# Patient Record
Sex: MALE | Race: WHITE | NOT HISPANIC OR LATINO | Employment: FULL TIME | URBAN - METROPOLITAN AREA
[De-identification: names, ages, dates, MRNs, and addresses within clinical notes are randomized per-mention and may not be internally consistent; named-entity substitution may affect disease eponyms.]

---

## 2017-03-13 ENCOUNTER — TELEPHONE (OUTPATIENT)
Dept: UROLOGY | Facility: CLINIC | Age: 32
End: 2017-03-13

## 2017-03-13 NOTE — TELEPHONE ENCOUNTER
----- Message from Melany Sims sent at 3/13/2017  2:59 PM CDT -----  Contact: [phone] - self  Freda - has questions re getting appt for fertility issues - please call patient at [phone]

## 2018-01-24 ENCOUNTER — OFFICE VISIT (OUTPATIENT)
Dept: URGENT CARE | Facility: CLINIC | Age: 33
End: 2018-01-24
Payer: COMMERCIAL

## 2018-01-24 VITALS
SYSTOLIC BLOOD PRESSURE: 137 MMHG | DIASTOLIC BLOOD PRESSURE: 79 MMHG | WEIGHT: 235 LBS | HEART RATE: 82 BPM | OXYGEN SATURATION: 96 % | RESPIRATION RATE: 18 BRPM | HEIGHT: 73 IN | BODY MASS INDEX: 31.14 KG/M2 | TEMPERATURE: 99 F

## 2018-01-24 DIAGNOSIS — J06.9 UPPER RESPIRATORY TRACT INFECTION, UNSPECIFIED TYPE: Primary | ICD-10-CM

## 2018-01-24 LAB
CTP QC/QA: YES
FLUAV AG NPH QL: NEGATIVE
FLUBV AG NPH QL: NEGATIVE

## 2018-01-24 PROCEDURE — 96372 THER/PROPH/DIAG INJ SC/IM: CPT | Mod: S$GLB,,, | Performed by: EMERGENCY MEDICINE

## 2018-01-24 PROCEDURE — 99213 OFFICE O/P EST LOW 20 MIN: CPT | Mod: SA,25,S$GLB, | Performed by: NURSE PRACTITIONER

## 2018-01-24 PROCEDURE — 87804 INFLUENZA ASSAY W/OPTIC: CPT | Mod: QW,S$GLB,, | Performed by: NURSE PRACTITIONER

## 2018-01-24 RX ORDER — BETAMETHASONE SODIUM PHOSPHATE AND BETAMETHASONE ACETATE 3; 3 MG/ML; MG/ML
9 INJECTION, SUSPENSION INTRA-ARTICULAR; INTRALESIONAL; INTRAMUSCULAR; SOFT TISSUE
Status: COMPLETED | OUTPATIENT
Start: 2018-01-24 | End: 2018-01-24

## 2018-01-24 RX ORDER — IBUPROFEN 200 MG
600 TABLET ORAL
Status: COMPLETED | OUTPATIENT
Start: 2018-01-24 | End: 2018-01-24

## 2018-01-24 RX ADMIN — BETAMETHASONE SODIUM PHOSPHATE AND BETAMETHASONE ACETATE 9 MG: 3; 3 INJECTION, SUSPENSION INTRA-ARTICULAR; INTRALESIONAL; INTRAMUSCULAR; SOFT TISSUE at 09:01

## 2018-01-24 RX ADMIN — Medication 600 MG: at 08:01

## 2018-01-24 NOTE — PATIENT INSTRUCTIONS
Take over the counter Claritin, Allegra, or Zyrtec for relief of symptoms.   Take over the counter Flonase for relief of symptoms.  These medications can be purchased at any local drug store or pharmacy.    Please arrange follow up with your primary medical clinic as soon as possible. You must understand that you've received an Urgent Care treatment only and that you may be released before all of your medical problems are known or treated. You, the patient, will arrange for follow up as instructed. If your symptoms worsen or fail to improve you should go to the Emergency Room.          Viral Upper Respiratory Illness (Adult)  You have a viral upper respiratory illness (URI), which is another term for the common cold. This illness is contagious during the first few days. It is spread through the air by coughing and sneezing. It may also be spread by direct contact (touching the sick person and then touching your own eyes, nose, or mouth). Frequent handwashing will decrease risk of spread. Most viral illnesses go away within 7 to 10 days with rest and simple home remedies. Sometimes the illness may last for several weeks. Antibiotics will not kill a virus, and they are generally not prescribed for this condition.    Home care  · If symptoms are severe, rest at home for the first 2 to 3 days. When you resume activity, don't let yourself get too tired.  · Avoid being exposed to cigarette smoke (yours or others).  · You may use acetaminophen or ibuprofen to control pain and fever, unless another medicine was prescribed. (Note: If you have chronic liver or kidney disease, have ever had a stomach ulcer or gastrointestinal bleeding, or are taking blood-thinning medicines, talk with your healthcare provider before using these medicines.) Aspirin should never be given to anyone under 18 years of age who is ill with a viral infection or fever. It may cause severe liver or brain damage.  · Your appetite may be poor, so a light  diet is fine. Avoid dehydration by drinking 6 to 8 glasses of fluids per day (water, soft drinks, juices, tea, or soup). Extra fluids will help loosen secretions in the nose and lungs.  · Over-the-counter cold medicines will not shorten the length of time youre sick, but they may be helpful for the following symptoms: cough, sore throat, and nasal and sinus congestion. (Note: Do not use decongestants if you have high blood pressure.)  Follow-up care  Follow up with your healthcare provider, or as advised.  When to seek medical advice  Call your healthcare provider right away if any of these occur:  · Cough with lots of colored sputum (mucus)  · Severe headache; face, neck, or ear pain  · Difficulty swallowing due to throat pain  · Fever of 100.4°F (38°C)  Call 911, or get immediate medical care  Call emergency services right away if any of these occur:  · Chest pain, shortness of breath, wheezing, or difficulty breathing  · Coughing up blood  · Inability to swallow due to throat pain  Date Last Reviewed: 9/13/2015 © 2000-2017 [employer]. [address] [place] [address]. All rights reserved. This information is not intended as a substitute for professional medical care. Always follow your healthcare professional's instructions.

## 2018-01-24 NOTE — PROGRESS NOTES
"Subjective:       Patient ID: Heber Silva is a 32 y.o. male.    Vitals:  height is 6' 1" (1.854 m) and weight is 106.6 kg (235 lb). His temperature is 99.3 °F (37.4 °C). His blood pressure is 137/79 and his pulse is 82. His respiration is 18 and oxygen saturation is 96%.     Chief Complaint: Sinus Problem    Sinus Problem   This is a new problem. The current episode started yesterday. The problem is unchanged. There has been no fever. He is experiencing no pain. Associated symptoms include congestion, headaches, sinus pressure and a sore throat. Pertinent negatives include no chills, coughing, ear pain or hoarse voice. Past treatments include nothing.     Review of Systems   Constitution: Positive for malaise/fatigue. Negative for chills and fever.   HENT: Positive for congestion, sinus pressure and sore throat. Negative for ear pain and hoarse voice.    Eyes: Negative for discharge and redness.   Cardiovascular: Positive for chest pain. Negative for dyspnea on exertion and leg swelling.   Respiratory: Negative for cough, sputum production and wheezing.    Musculoskeletal: Negative for myalgias.   Gastrointestinal: Negative for abdominal pain and nausea.   Neurological: Positive for headaches.   All other systems reviewed and are negative.      Objective:      Physical Exam   Constitutional: He is oriented to person, place, and time. He appears well-developed and well-nourished.   HENT:   Head: Normocephalic and atraumatic.   Right Ear: Hearing, tympanic membrane, external ear and ear canal normal. Tympanic membrane is not erythematous.   Left Ear: Hearing, tympanic membrane, external ear and ear canal normal. Tympanic membrane is not erythematous.   Nose: Mucosal edema and rhinorrhea present. Right sinus exhibits no maxillary sinus tenderness and no frontal sinus tenderness. Left sinus exhibits no maxillary sinus tenderness and no frontal sinus tenderness.   Mouth/Throat: Uvula is midline, oropharynx is clear " and moist and mucous membranes are normal. No posterior oropharyngeal edema or posterior oropharyngeal erythema.   Eyes: Conjunctivae, EOM and lids are normal. Right conjunctiva is not injected. Left conjunctiva is not injected.   Neck: Normal range of motion and full passive range of motion without pain. Neck supple.   Cardiovascular: Normal rate, regular rhythm, S1 normal, S2 normal, normal heart sounds and normal pulses.    Pulmonary/Chest: Effort normal and breath sounds normal. No respiratory distress. He has no decreased breath sounds. He has no wheezes.   Neurological: He is alert and oriented to person, place, and time.   Skin: Skin is warm and dry.   Nursing note and vitals reviewed.      Assessment:       1. Upper respiratory tract infection, unspecified type        Plan:         Upper respiratory tract infection, unspecified type  -     POCT Influenza A/B    Other orders  -     ibuprofen tablet 600 mg; Take 3 tablets (600 mg total) by mouth one time.  -     betamethasone acetate-betamethasone sodium phosphate injection 9 mg; Inject 1.5 mLs (9 mg total) into the muscle one time.

## 2018-05-21 ENCOUNTER — OFFICE VISIT (OUTPATIENT)
Dept: URGENT CARE | Facility: CLINIC | Age: 33
End: 2018-05-21
Payer: COMMERCIAL

## 2018-05-21 VITALS
HEIGHT: 73 IN | DIASTOLIC BLOOD PRESSURE: 76 MMHG | OXYGEN SATURATION: 98 % | SYSTOLIC BLOOD PRESSURE: 121 MMHG | TEMPERATURE: 98 F | BODY MASS INDEX: 31.81 KG/M2 | WEIGHT: 240 LBS | RESPIRATION RATE: 18 BRPM | HEART RATE: 80 BPM

## 2018-05-21 DIAGNOSIS — R11.2 NAUSEA AND VOMITING, INTRACTABILITY OF VOMITING NOT SPECIFIED, UNSPECIFIED VOMITING TYPE: Primary | ICD-10-CM

## 2018-05-21 PROCEDURE — 99214 OFFICE O/P EST MOD 30 MIN: CPT | Mod: SA,S$GLB,, | Performed by: PHYSICIAN ASSISTANT

## 2018-05-21 PROCEDURE — 96372 THER/PROPH/DIAG INJ SC/IM: CPT | Mod: S$GLB,,, | Performed by: FAMILY MEDICINE

## 2018-05-21 PROCEDURE — 3008F BODY MASS INDEX DOCD: CPT | Mod: CPTII,S$GLB,, | Performed by: PHYSICIAN ASSISTANT

## 2018-05-21 RX ORDER — ONDANSETRON 2 MG/ML
4 INJECTION INTRAMUSCULAR; INTRAVENOUS
Status: COMPLETED | OUTPATIENT
Start: 2018-05-21 | End: 2018-05-21

## 2018-05-21 RX ORDER — ONDANSETRON 4 MG/1
4 TABLET, ORALLY DISINTEGRATING ORAL EVERY 6 HOURS PRN
Qty: 20 TABLET | Refills: 0 | Status: SHIPPED | OUTPATIENT
Start: 2018-05-21 | End: 2019-02-27

## 2018-05-21 RX ORDER — ONDANSETRON 2 MG/ML
4 INJECTION INTRAMUSCULAR; INTRAVENOUS
Status: DISCONTINUED | OUTPATIENT
Start: 2018-05-21 | End: 2018-05-21

## 2018-05-21 RX ADMIN — ONDANSETRON 4 MG: 2 INJECTION INTRAMUSCULAR; INTRAVENOUS at 11:05

## 2018-05-21 NOTE — PROGRESS NOTES
"Subjective:       Patient ID: Heber Silva is a 32 y.o. male.    Vitals:  height is 6' 1" (1.854 m) and weight is 108.9 kg (240 lb). His temperature is 98 °F (36.7 °C). His blood pressure is 121/76 and his pulse is 80. His respiration is 18 and oxygen saturation is 98%.     Chief Complaint: Emesis    This is a 32 y.o. male with History reviewed. No pertinent past medical history.    History reviewed. No pertinent surgical history.  who presents today with a chief complaint of   Nausea and vomiting started this morning.  He says it started with a sensation of heartburn, so he took tums and then started vomiting.  He feels better after each episode of vomiting.  He feels like he is going to have diarrhea, but each time he has gone to the bathroom so far it has been normal.      Emesis    This is a new problem. The current episode started today. The problem occurs 5 to 10 times per day. The problem has been unchanged. The emesis has an appearance of bile and stomach contents. There has been no fever. Associated symptoms include abdominal pain. Pertinent negatives include no chest pain, chills, diarrhea or fever. He has tried nothing for the symptoms. The treatment provided no relief.     Review of Systems   Constitution: Negative for chills and fever.   Cardiovascular: Negative for chest pain.   Respiratory: Negative for shortness of breath.    Musculoskeletal: Negative for back pain.   Gastrointestinal: Positive for abdominal pain, nausea and vomiting. Negative for constipation, diarrhea, hematochezia and melena.   Genitourinary: Negative for dysuria.       Objective:      Physical Exam   Constitutional: He is oriented to person, place, and time. He appears well-developed and well-nourished. No distress.   HENT:   Head: Normocephalic and atraumatic.   Eyes: Conjunctivae are normal.   Neck: Normal range of motion. Neck supple.   Cardiovascular: Normal rate and regular rhythm.  Exam reveals no gallop and no friction rub. "    No murmur heard.  Pulmonary/Chest: Effort normal and breath sounds normal. He has no wheezes. He has no rales.   Abdominal: Soft. Bowel sounds are normal. He exhibits no distension. There is tenderness in the right upper quadrant, epigastric area and left upper quadrant.   Musculoskeletal: Normal range of motion.   Neurological: He is alert and oriented to person, place, and time.   Skin: Skin is warm and dry. No rash noted. No erythema.   Psychiatric: He has a normal mood and affect. His behavior is normal. Judgment and thought content normal.   Nursing note and vitals reviewed.      Assessment:       1. Nausea and vomiting, intractability of vomiting not specified, unspecified vomiting type        Plan:         Nausea and vomiting, intractability of vomiting not specified, unspecified vomiting type  -     Discontinue: ondansetron injection 4 mg; Inject 4 mg into the vein one time.  -     ondansetron (ZOFRAN-ODT) 4 MG TbDL; Take 1 tablet (4 mg total) by mouth every 6 (six) hours as needed.  Dispense: 20 tablet; Refill: 0  -     ondansetron injection 4 mg; Inject 4 mg into the muscle one time.      Heber was seen today for emesis.    Diagnoses and all orders for this visit:    Nausea and vomiting, intractability of vomiting not specified, unspecified vomiting type  -     Discontinue: ondansetron injection 4 mg; Inject 4 mg into the vein one time.  -     ondansetron (ZOFRAN-ODT) 4 MG TbDL; Take 1 tablet (4 mg total) by mouth every 6 (six) hours as needed.  -     ondansetron injection 4 mg; Inject 4 mg into the muscle one time.      Patient Instructions     - Rest.    - Drink plenty of fluids.    - Tylenol or Ibuprofen as directed as needed for fever/pain.    - Take over the counter Imodium as directed as needed for diarrhea.  - Use over the counter pepcid, zantac, or tagament as directed.  - Use over the counter prilosec or nexium as directed.  - Follow up with your PCP or specialty clinic as directed in the next  "1-2 weeks if not improved or as needed.  You can call [phone] to schedule an appointment with the appropriate provider.    - Go to the ED if your symptoms worsen.    Vomiting (Adult)  Vomiting is a common symptom that may be due to different causes. These include gastroenteritis ("stomach flu"), food poisoning and gastritis. There are other more serious causes of vomiting which may be hard to diagnose early in the illness. Therefore, it is important to watch for the warning signs listed below.  The main danger from repeated vomiting is dehydration. This is due to excess loss of water and minerals from the body. When this occurs, body fluids must be replaced.  Home care  · If symptoms are severe, rest at home for the next 24 hours.  · Because your symptoms may be from an infection, wash your hands frequently and well, and use alcohol-based  to avoid spreading the infection to others.  · Wash your hands for at least 20 seconds. Hum the happy birthday song twice for the correct length of time.  · Wash your hands after using the toilet, before and after preparing food, before eating food, after changing a diaper, cleaning a wound, caring for a sick person, and blowing your nose, coughing, or sneezing. You should also wash your hands after caring for someone who is sick, touching pet food, or treats, and touching an animal, or animal waste.  · You may use acetaminophen or NSAID medicines like ibuprofen or naproxen to control fever, unless another medicine was prescribed. If you have chronic liver or kidney disease or ever had a stomach ulcer or GI bleeding, talk with your doctor before using these medicines. Aspirin should never be used in anyone under 18 years of age who is ill with a fever. It may cause severe liver damage. Don't use NSAID medicines if you are already taking one for another condition (like arthritis) or are on aspirin (such as for heart disease, or after a stroke)  · Avoid tobacco and " alcohol use, which may worsen your symptoms.  · If medicines for vomiting were prescribed, take as directed.  · Once vomiting stops, then follow these guidelines:  During the first 12 to 24 hours follow the diet below:  · Fruit juices. Apple, grape juice, clear fruit drinks, and electrolyte replacement drinks.  · Beverages. Soft drinks without caffeine; mineral water (plain or flavored), decaffeinated tea and coffee.  · Soups. Clear broth, consommé and bouillon  · Desserts. Plain gelatin, popsicles and fruit juice bars. As you feel better, you may add 6-8 ounces of yogurt per day.  During the next 24 hours you may add the following to the above:  · Hot cereal, plain toast, bread, rolls, crackers  · Plain noodles, rice, mashed potatoes, chicken noodle or rice soup  · Unsweetened canned fruit (avoid pineapple), bananas  · Limit caffeine and chocolate. No spices or seasonings except salt.  During the next 24 hours:  Gradually resume a normal diet, as you feel better and your symptoms lessen.  Follow-up care  Follow up with your healthcare provider, or as advised.  When to seek medical advice  Call your healthcare provider right away if any of these occur:  · Constant right-sided lower abdominal pain or increasing general abdominal pain  · Continued vomiting (unable to keep liquids down) for 24 hours  · Frequent diarrhea (more than 5 times a day); blood (red or black color) or mucus in diarrhea  · Reduced urine output or extreme thirst  · Weakness, dizziness or fainting  · Unusually drowsy or confused  · Fever of 100.4°F (38°C) oral or higher, or as directed  · Yellow color of the eyes or skin  Date Last Reviewed: 11/16/2015  © 2273-9222 [employer]. [address] [place] [address], [place] [address]. All rights reserved. This information is not intended as a substitute for professional medical care. Always follow your healthcare professional's instructions.        Self-Care for Vomiting and Diarrhea    Vomiting  and diarrhea can make you miserable. Your stomach and bowels are reacting to an irritant. This might be food, medicine, or viral stomach flu. Vomiting and diarrhea are two ways your body can remove the problem from your system. Nausea is a symptom that discourages you from eating. This gives your stomach and bowels time to recover. To get back to normal, start with self-care to ease your discomfort.  Drink liquids  Drink or sip liquids to avoid losing too much fluid (dehydration):  · Clear liquids such as water or broth are the best choices.  · Do not drink beverages with a lot of sugar in them, such as juices and sodas. These can make diarrhea worse.  · If you have severe vomiting, do not drink sport drinks, such as electrolyte solutions. These don't have the right mix of water, sugar, and minerals. They can also make the symptoms worse. In this situation, commercially available oral rehydration solutions are best.   · Suck on ice chips if the thought of drinking something makes you queasy.  When youre able to eat again  Tips include the following:  · As your appetite comes back, you can resume your normal diet.  · Ask your healthcare provider whether you should stay away from any foods.  Medicines  Know the following about medicines:  · Vomiting and diarrhea are ways your body uses to rid itself of harmful substances such as bacteria. DO NOT use antidiarrheal or antivomiting (antiemetic) medicines unless your healthcare provider tells you to do so.  · Aspirin, medicine with aspirin, and many aspirin substitutes can irritate your stomach. So avoid them when you have stomach upset.  · Certain prescription and over-the-counter medicines can cause vomiting and diarrhea. Talk with your healthcare provider about any medicines you take that may be causing these symptoms.  · Certain over-the-counter antihistamines can help control nausea. Other medicines can help soothe stomach upset. Ask your healthcare provider which  medicines may help you.  When to call your healthcare provider  Call your healthcare provider if you have:  · Bloody or black vomit or stools  · Severe, steady belly pain  · Vomiting with a severe headache or vomiting after a head injury  · Vomiting and diarrhea together for more than an hour  · An inability to hold down even sips of liquids for more than 12 hours  · Vomiting that lasts more than 24 hours  · Severe diarrhea that lasts more than 2 days  · Yellowish color to your skin or the whites of your eyes  · Inability to urinate. In infants and young children, not making wet diapers.    Date Last Reviewed: 6/1/2016  © 8185-8006 [employer]. [address] [place] [address], [place] [address]. All rights reserved. This information is not intended as a substitute for professional medical care. Always follow your healthcare professional's instructions.        Diet for Vomiting or Diarrhea (Adult)    Your symptoms may return or get worse after eating certain foods listed below. If this happens, stop eating these foods until your symptoms ease and you feel better.  Once the vomiting stops, follow the steps below.   During the first 12 to 24 hours  During the first 12 to 24 hours, follow this diet:  · Drinks. Plain water, sport drinks like electrolyte solutions, soft drinks without caffeine, mineral water (plain or flavored), clear fruit juices, and decaffeinated tea and coffee.  · Soups. Clear broth.  · Desserts. Plain gelatin, popsicles, and fruit juice bars. As you feel better, you may add 6 to 8 ounces of yogurt per day. If you have diarrhea, don't have foods or drinks that contain sugar, high-fructose corn syrup, or sugar alcohols.  During the next 24 hours  During the next 24 hours you may add the following to the above:  · Hot cereal, plain toast, bread, rolls, and crackers  · Plain noodles, rice, mashed potatoes, and chicken noodle or rice soup  · Unsweetened canned fruit (but not pineapple) and  bananas  Don't eat more than 15 grams of fat a day. Do this by staying away from margarine, butter, oils, mayonnaise, sauces, gravies, fried foods, peanut butter, meat, poultry, and fish.  Don't eat much fiber. Stay away from raw or cooked vegetables, fresh fruits (except bananas), and bran cereals.  Limit how much caffeine and chocolate you have. Do not use any spices or seasonings except salt.  During the next 24 hours  Slowly go back to your normal diet, as you feel better and your symptoms ease.  Date Last Reviewed: 8/1/2016  © 6491-8648 [employer]. [address] [place] [address], [place] [address]. All rights reserved. This information is not intended as a substitute for professional medical care. Always follow your healthcare professional's instructions.

## 2018-05-21 NOTE — PATIENT INSTRUCTIONS
"- Rest.    - Drink plenty of fluids.    - Tylenol or Ibuprofen as directed as needed for fever/pain.    - Take over the counter Imodium as directed as needed for diarrhea.  - Use over the counter pepcid, zantac, or tagament as directed.  - Use over the counter prilosec or nexium as directed.  - Follow up with your PCP or specialty clinic as directed in the next 1-2 weeks if not improved or as needed.  You can call [phone] to schedule an appointment with the appropriate provider.    - Go to the ED if your symptoms worsen.    Vomiting (Adult)  Vomiting is a common symptom that may be due to different causes. These include gastroenteritis ("stomach flu"), food poisoning and gastritis. There are other more serious causes of vomiting which may be hard to diagnose early in the illness. Therefore, it is important to watch for the warning signs listed below.  The main danger from repeated vomiting is dehydration. This is due to excess loss of water and minerals from the body. When this occurs, body fluids must be replaced.  Home care  · If symptoms are severe, rest at home for the next 24 hours.  · Because your symptoms may be from an infection, wash your hands frequently and well, and use alcohol-based  to avoid spreading the infection to others.  · Wash your hands for at least 20 seconds. Hum the happy birthday song twice for the correct length of time.  · Wash your hands after using the toilet, before and after preparing food, before eating food, after changing a diaper, cleaning a wound, caring for a sick person, and blowing your nose, coughing, or sneezing. You should also wash your hands after caring for someone who is sick, touching pet food, or treats, and touching an animal, or animal waste.  · You may use acetaminophen or NSAID medicines like ibuprofen or naproxen to control fever, unless another medicine was prescribed. If you have chronic liver or kidney disease or ever had a stomach ulcer or GI " bleeding, talk with your doctor before using these medicines. Aspirin should never be used in anyone under 18 years of age who is ill with a fever. It may cause severe liver damage. Don't use NSAID medicines if you are already taking one for another condition (like arthritis) or are on aspirin (such as for heart disease, or after a stroke)  · Avoid tobacco and alcohol use, which may worsen your symptoms.  · If medicines for vomiting were prescribed, take as directed.  · Once vomiting stops, then follow these guidelines:  During the first 12 to 24 hours follow the diet below:  · Fruit juices. Apple, grape juice, clear fruit drinks, and electrolyte replacement drinks.  · Beverages. Soft drinks without caffeine; mineral water (plain or flavored), decaffeinated tea and coffee.  · Soups. Clear broth, consommé and bouillon  · Desserts. Plain gelatin, popsicles and fruit juice bars. As you feel better, you may add 6-8 ounces of yogurt per day.  During the next 24 hours you may add the following to the above:  · Hot cereal, plain toast, bread, rolls, crackers  · Plain noodles, rice, mashed potatoes, chicken noodle or rice soup  · Unsweetened canned fruit (avoid pineapple), bananas  · Limit caffeine and chocolate. No spices or seasonings except salt.  During the next 24 hours:  Gradually resume a normal diet, as you feel better and your symptoms lessen.  Follow-up care  Follow up with your healthcare provider, or as advised.  When to seek medical advice  Call your healthcare provider right away if any of these occur:  · Constant right-sided lower abdominal pain or increasing general abdominal pain  · Continued vomiting (unable to keep liquids down) for 24 hours  · Frequent diarrhea (more than 5 times a day); blood (red or black color) or mucus in diarrhea  · Reduced urine output or extreme thirst  · Weakness, dizziness or fainting  · Unusually drowsy or confused  · Fever of 100.4°F (38°C) oral or higher, or as  directed  · Yellow color of the eyes or skin  Date Last Reviewed: 11/16/2015  © 3473-7854 [employer]. [address] [place] [address], [place] [address]. All rights reserved. This information is not intended as a substitute for professional medical care. Always follow your healthcare professional's instructions.        Self-Care for Vomiting and Diarrhea    Vomiting and diarrhea can make you miserable. Your stomach and bowels are reacting to an irritant. This might be food, medicine, or viral stomach flu. Vomiting and diarrhea are two ways your body can remove the problem from your system. Nausea is a symptom that discourages you from eating. This gives your stomach and bowels time to recover. To get back to normal, start with self-care to ease your discomfort.  Drink liquids  Drink or sip liquids to avoid losing too much fluid (dehydration):  · Clear liquids such as water or broth are the best choices.  · Do not drink beverages with a lot of sugar in them, such as juices and sodas. These can make diarrhea worse.  · If you have severe vomiting, do not drink sport drinks, such as electrolyte solutions. These don't have the right mix of water, sugar, and minerals. They can also make the symptoms worse. In this situation, commercially available oral rehydration solutions are best.   · Suck on ice chips if the thought of drinking something makes you queasy.  When youre able to eat again  Tips include the following:  · As your appetite comes back, you can resume your normal diet.  · Ask your healthcare provider whether you should stay away from any foods.  Medicines  Know the following about medicines:  · Vomiting and diarrhea are ways your body uses to rid itself of harmful substances such as bacteria. DO NOT use antidiarrheal or antivomiting (antiemetic) medicines unless your healthcare provider tells you to do so.  · Aspirin, medicine with aspirin, and many aspirin substitutes can irritate your stomach. So  avoid them when you have stomach upset.  · Certain prescription and over-the-counter medicines can cause vomiting and diarrhea. Talk with your healthcare provider about any medicines you take that may be causing these symptoms.  · Certain over-the-counter antihistamines can help control nausea. Other medicines can help soothe stomach upset. Ask your healthcare provider which medicines may help you.  When to call your healthcare provider  Call your healthcare provider if you have:  · Bloody or black vomit or stools  · Severe, steady belly pain  · Vomiting with a severe headache or vomiting after a head injury  · Vomiting and diarrhea together for more than an hour  · An inability to hold down even sips of liquids for more than 12 hours  · Vomiting that lasts more than 24 hours  · Severe diarrhea that lasts more than 2 days  · Yellowish color to your skin or the whites of your eyes  · Inability to urinate. In infants and young children, not making wet diapers.    Date Last Reviewed: 6/1/2016  © 8294-1734 [employer]. [address] [place] [address]. All rights reserved. This information is not intended as a substitute for professional medical care. Always follow your healthcare professional's instructions.        Diet for Vomiting or Diarrhea (Adult)    Your symptoms may return or get worse after eating certain foods listed below. If this happens, stop eating these foods until your symptoms ease and you feel better.  Once the vomiting stops, follow the steps below.   During the first 12 to 24 hours  During the first 12 to 24 hours, follow this diet:  · Drinks. Plain water, sport drinks like electrolyte solutions, soft drinks without caffeine, mineral water (plain or flavored), clear fruit juices, and decaffeinated tea and coffee.  · Soups. Clear broth.  · Desserts. Plain gelatin, popsicles, and fruit juice bars. As you feel better, you may add 6 to 8 ounces of yogurt per day. If you have  diarrhea, don't have foods or drinks that contain sugar, high-fructose corn syrup, or sugar alcohols.  During the next 24 hours  During the next 24 hours you may add the following to the above:  · Hot cereal, plain toast, bread, rolls, and crackers  · Plain noodles, rice, mashed potatoes, and chicken noodle or rice soup  · Unsweetened canned fruit (but not pineapple) and bananas  Don't eat more than 15 grams of fat a day. Do this by staying away from margarine, butter, oils, mayonnaise, sauces, gravies, fried foods, peanut butter, meat, poultry, and fish.  Don't eat much fiber. Stay away from raw or cooked vegetables, fresh fruits (except bananas), and bran cereals.  Limit how much caffeine and chocolate you have. Do not use any spices or seasonings except salt.  During the next 24 hours  Slowly go back to your normal diet, as you feel better and your symptoms ease.  Date Last Reviewed: 8/1/2016  © 8140-7887 [employer]. [address] [place] [address]. All rights reserved. This information is not intended as a substitute for professional medical care. Always follow your healthcare professional's instructions.

## 2019-02-27 ENCOUNTER — HOSPITAL ENCOUNTER (EMERGENCY)
Facility: HOSPITAL | Age: 34
Discharge: HOME | End: 2019-02-28
Attending: EMERGENCY MEDICINE
Payer: COMMERCIAL

## 2019-02-27 VITALS
BODY MASS INDEX: 31.14 KG/M2 | HEART RATE: 104 BPM | TEMPERATURE: 99 F | HEIGHT: 73 IN | WEIGHT: 235 LBS | RESPIRATION RATE: 16 BRPM | OXYGEN SATURATION: 96 % | DIASTOLIC BLOOD PRESSURE: 81 MMHG | SYSTOLIC BLOOD PRESSURE: 125 MMHG

## 2019-02-27 DIAGNOSIS — R19.7 DIARRHEA, UNSPECIFIED TYPE: Primary | ICD-10-CM

## 2019-02-27 DIAGNOSIS — R10.13 EPIGASTRIC PAIN: ICD-10-CM

## 2019-02-27 LAB
ALBUMIN SERPL BCP-MCNC: 4 G/DL
ALP SERPL-CCNC: 67 U/L
ALT SERPL W/O P-5'-P-CCNC: 33 U/L
ANION GAP SERPL CALC-SCNC: 8 MMOL/L
AST SERPL-CCNC: 33 U/L
BASOPHILS # BLD AUTO: 0.03 K/UL
BASOPHILS NFR BLD: 0.4 %
BILIRUB SERPL-MCNC: 0.9 MG/DL
BUN SERPL-MCNC: 14 MG/DL
CALCIUM SERPL-MCNC: 9.5 MG/DL
CHLORIDE SERPL-SCNC: 105 MMOL/L
CO2 SERPL-SCNC: 27 MMOL/L
CREAT SERPL-MCNC: 1.5 MG/DL
DIFFERENTIAL METHOD: ABNORMAL
EOSINOPHIL # BLD AUTO: 0.2 K/UL
EOSINOPHIL NFR BLD: 2.9 %
ERYTHROCYTE [DISTWIDTH] IN BLOOD BY AUTOMATED COUNT: 12.2 %
EST. GFR  (AFRICAN AMERICAN): >60 ML/MIN/1.73 M^2
EST. GFR  (NON AFRICAN AMERICAN): 59.9 ML/MIN/1.73 M^2
GLUCOSE SERPL-MCNC: 82 MG/DL
HCT VFR BLD AUTO: 50.3 %
HGB BLD-MCNC: 16.4 G/DL
IMM GRANULOCYTES # BLD AUTO: 0.03 K/UL
IMM GRANULOCYTES NFR BLD AUTO: 0.4 %
LIPASE SERPL-CCNC: 18 U/L
LYMPHOCYTES # BLD AUTO: 1 K/UL
LYMPHOCYTES NFR BLD: 14.5 %
MCH RBC QN AUTO: 30.6 PG
MCHC RBC AUTO-ENTMCNC: 32.6 G/DL
MCV RBC AUTO: 94 FL
MONOCYTES # BLD AUTO: 0.7 K/UL
MONOCYTES NFR BLD: 9.5 %
NEUTROPHILS # BLD AUTO: 5 K/UL
NEUTROPHILS NFR BLD: 72.3 %
NRBC BLD-RTO: 0 /100 WBC
PLATELET # BLD AUTO: 212 K/UL
PMV BLD AUTO: 11.9 FL
POTASSIUM SERPL-SCNC: 3.9 MMOL/L
PROT SERPL-MCNC: 7.8 G/DL
RBC # BLD AUTO: 5.36 M/UL
SODIUM SERPL-SCNC: 140 MMOL/L
WBC # BLD AUTO: 6.92 K/UL

## 2019-02-27 PROCEDURE — 25000003 PHARM REV CODE 250: Performed by: EMERGENCY MEDICINE

## 2019-02-27 PROCEDURE — 93010 ELECTROCARDIOGRAM REPORT: CPT | Mod: ,,, | Performed by: INTERNAL MEDICINE

## 2019-02-27 PROCEDURE — 87427 SHIGA-LIKE TOXIN AG IA: CPT | Mod: 59

## 2019-02-27 PROCEDURE — 96360 HYDRATION IV INFUSION INIT: CPT

## 2019-02-27 PROCEDURE — 99285 EMERGENCY DEPT VISIT HI MDM: CPT | Mod: ,,, | Performed by: EMERGENCY MEDICINE

## 2019-02-27 PROCEDURE — 99285 PR EMERGENCY DEPT VISIT,LEVEL V: ICD-10-PCS | Mod: ,,, | Performed by: EMERGENCY MEDICINE

## 2019-02-27 PROCEDURE — 85025 COMPLETE CBC W/AUTO DIFF WBC: CPT

## 2019-02-27 PROCEDURE — 87045 FECES CULTURE AEROBIC BACT: CPT

## 2019-02-27 PROCEDURE — 93005 ELECTROCARDIOGRAM TRACING: CPT

## 2019-02-27 PROCEDURE — 96361 HYDRATE IV INFUSION ADD-ON: CPT

## 2019-02-27 PROCEDURE — 83690 ASSAY OF LIPASE: CPT

## 2019-02-27 PROCEDURE — 80053 COMPREHEN METABOLIC PANEL: CPT

## 2019-02-27 PROCEDURE — 93010 EKG 12-LEAD: ICD-10-PCS | Mod: ,,, | Performed by: INTERNAL MEDICINE

## 2019-02-27 PROCEDURE — 87046 STOOL CULTR AEROBIC BACT EA: CPT

## 2019-02-27 PROCEDURE — 99285 EMERGENCY DEPT VISIT HI MDM: CPT | Mod: 25

## 2019-02-27 RX ADMIN — SODIUM CHLORIDE 1000 ML: 0.9 INJECTION, SOLUTION INTRAVENOUS at 11:02

## 2019-02-27 RX ADMIN — ALUMINUM HYDROXIDE, MAGNESIUM HYDROXIDE, AND SIMETHICONE 50 ML: 200; 200; 20 SUSPENSION ORAL at 11:02

## 2019-02-28 NOTE — ED PROVIDER NOTES
"SCRIBE #1 NOTE: I, Gui Wall, am scribing for, and in the presence of,  Belén Sheldon MD. I have scribed the following portions of the note - Other sections scribed: HPI, ROS, PE, MDM.       CC: Diarrhea (Pt to ER c/o diarrhea starting today. He states he was constipated and took a laxative yesterday. He tried taking 2 immodium tonight without relief. He also c/o nausea. )      History provided by: Patient    HPI: Heber Silva is a 33 y.o. year old male who presents to the ED complaining of epigastric pain for 2 days and diarrhea since today.   Pt states that the pain is a sharp pain that is continuous, non-radiating. He noticed that his upper abdomen was very distended a couple of days ago, and he did not have a bowel movement for about 3 days. He decided to take laxatives and this caused him to have a bowel movement and diarrhea all day today. He states he is using the restroom and having diarrhea episodes every "10 minutes." He endorses nausea.  No fever/blood in the stool but blood on toilet paper when he wipes, no n/v      History reviewed. No pertinent past medical history.  Past Surgical History:   Procedure Laterality Date    KIDNEY SURGERY      KNEE SURGERY Left 2018     Family History   Problem Relation Age of Onset    Amblyopia Neg Hx     Blindness Neg Hx     Cataracts Neg Hx     Glaucoma Neg Hx     Macular degeneration Neg Hx     Retinal detachment Neg Hx     Strabismus Neg Hx      No current facility-administered medications on file prior to encounter.      Current Outpatient Medications on File Prior to Encounter   Medication Sig Dispense Refill    [DISCONTINUED] ondansetron (ZOFRAN-ODT) 4 MG TbDL Take 1 tablet (4 mg total) by mouth every 6 (six) hours as needed. 20 tablet 0     Amoxil [amoxicillin]  Social History     Socioeconomic History    Marital status:      Spouse name: None    Number of children: None    Years of education: None    Highest education level: None "   Social Needs    Financial resource strain: None    Food insecurity - worry: None    Food insecurity - inability: None    Transportation needs - medical: None    Transportation needs - non-medical: None   Occupational History    None   Tobacco Use    Smoking status: Never Smoker   Substance and Sexual Activity    Alcohol use: Yes    Drug use: None    Sexual activity: None   Other Topics Concern    None   Social History Narrative    None       ROS:     Constitutional : neg  HEENT neg  Resp neg  Cardiac  neg  GI pos abdominal pain/distention/diarrhea   neg  Neuro neg  Heme/Immune: neg  Endo neg  Skin neg    PHYSICAL EXAM:  Vitals:    02/27/19 2039   BP: 125/81   Pulse: 104   Resp: 16   Temp: 99.2 °F (37.3 °C)         PHYSICAL EXAM:   general: comfortable, in no acute distress  VS: triage VS reviewed  HEENT: NC/AT, PERRL, EOMI  Neck: trachea midline  CV: RRR, no m/r/g, no LE pitting edema  Resp: CTAB  ABD:  ND, + normal BS, right upper and epigastric pain to palpation. No peritoneal signs   MSK: Left knee in Velcro splint, ambulating well in the room.  Renal: No CVAT  Neuro: AAO x 3, 5/5 muscle strength in upper and lower extremities, sensation grossly intact, face symmetric, speech normal  Ext: no deformity, +2 symmetrical peripheral pulses          DATA & INTERVENTIONS:    LABS reviewed:  Labs Reviewed - No data to display    RADIOLOGY reviewed:  Imaging Results    None         MEDICATIONS/FLUIDS:  Medications - No data to display      MDM:  Heber Silva is a 33 y.o. year old male who presents to the ED complaining of with epigastric pain for 2 days, worse with food, constipation. Took laxatives today and since then he has been having numerous bouts of watery diarrhea. Will obtain blood work, stool culture, will start on IV fluids and GI cocktail.    DDX includes but not limited to: laxative induced diarrhea vs infectious diarrhea vs cholecystitis vs peptic ulcer vs gastritis vs esophagitis    Labs  ordered and interpreted: no gross abn, CKD at baseline, wbc wnl, lipase wnl    abd XR (ordered and independently reviewed): bowel gas non-obstructive    EKG (independantly reviewed): Mild tachycardia at 101 bpm. T wave inversions in lead 3.    12:41 AM  feeling better, decreased freq of BMs in the ED. Discussed hydration, diet with diarrhea and signs and symptoms that would require return to the ED  PO challenge    IMPRESSION:  1.) diarrhea  2.) Epig pain      Dispo: Discharge    Critical Care Time: N/A         Belén Sheldon MD  02/28/19 3295

## 2019-02-28 NOTE — ED TRIAGE NOTES
Heber ROBB Silva, a 33 y.o. male presents to the ED w/ complaint of     Triage note:  Chief Complaint   Patient presents with    Diarrhea     Pt to ER c/o diarrhea starting today. He states he was constipated and took a laxative yesterday. He tried taking 2 immodium tonight without relief. He also c/o nausea.      Review of patient's allergies indicates:   Allergen Reactions    Amoxil [amoxicillin]      No past medical history on file.   Statement Selected

## 2019-02-28 NOTE — ED NOTES
Patient identifiers verified and correct for Heber Silva.    LOC: The patient is awake, alert and aware of environment with an appropriate affect, the patient is oriented x 3 and speaking appropriately.  APPEARANCE: Patient resting comfortably and in no acute distress, patient is clean and well groomed, patient's clothing is properly fastened.  SKIN: The skin is warm and dry, color consistent with ethnicity, patient has normal skin turgor and moist mucus membranes, skin intact, no breakdown or bruising noted.  MUSCULOSKELETAL: Patient moving all extremities spontaneously, no obvious swelling or deformities noted.  RESPIRATORY: Airway is open and patent, respirations are spontaneous, patient has a normal effort and rate, no accessory muscle use noted, bilateral breath sounds even and clear.  CARDIAC: Patient has a normal rate and regular rhythm, no periphreal edema noted, capillary refill < 3 seconds.  ABDOMEN: Soft and tender to palpation, slight distention noted, hyperactive bowel sounds present in all four quadrants.  NEUROLOGIC: Pupils equal bilaterally, eyes open spontaneously, behavior appropriate to situation, follows commands, facial expression symmetrical, bilateral hand grasp equal and even, purposeful motor response noted, normal sensation in all extremities when touched with a finger.

## 2019-03-01 LAB
E COLI SXT1 STL QL IA: NEGATIVE
E COLI SXT2 STL QL IA: NEGATIVE

## 2019-03-04 LAB — BACTERIA STL CULT: NORMAL

## 2019-06-21 ENCOUNTER — OFFICE VISIT (OUTPATIENT)
Dept: URGENT CARE | Facility: CLINIC | Age: 34
End: 2019-06-21
Payer: COMMERCIAL

## 2019-06-21 VITALS
RESPIRATION RATE: 18 BRPM | TEMPERATURE: 100 F | WEIGHT: 235 LBS | HEART RATE: 102 BPM | BODY MASS INDEX: 31.14 KG/M2 | HEIGHT: 73 IN | OXYGEN SATURATION: 97 % | SYSTOLIC BLOOD PRESSURE: 132 MMHG | DIASTOLIC BLOOD PRESSURE: 87 MMHG

## 2019-06-21 DIAGNOSIS — J03.90 EXUDATIVE TONSILLITIS: Primary | ICD-10-CM

## 2019-06-21 LAB
CTP QC/QA: YES
S PYO RRNA THROAT QL PROBE: NEGATIVE

## 2019-06-21 PROCEDURE — 3008F BODY MASS INDEX DOCD: CPT | Mod: CPTII,S$GLB,, | Performed by: NURSE PRACTITIONER

## 2019-06-21 PROCEDURE — 96372 PR INJECTION,THERAP/PROPH/DIAG2ST, IM OR SUBCUT: ICD-10-PCS | Mod: S$GLB,,, | Performed by: EMERGENCY MEDICINE

## 2019-06-21 PROCEDURE — 99214 OFFICE O/P EST MOD 30 MIN: CPT | Mod: 25,S$GLB,, | Performed by: NURSE PRACTITIONER

## 2019-06-21 PROCEDURE — 99214 PR OFFICE/OUTPT VISIT, EST, LEVL IV, 30-39 MIN: ICD-10-PCS | Mod: 25,S$GLB,, | Performed by: NURSE PRACTITIONER

## 2019-06-21 PROCEDURE — 87880 POCT RAPID STREP A: ICD-10-PCS | Mod: QW,S$GLB,, | Performed by: NURSE PRACTITIONER

## 2019-06-21 PROCEDURE — 3008F PR BODY MASS INDEX (BMI) DOCUMENTED: ICD-10-PCS | Mod: CPTII,S$GLB,, | Performed by: NURSE PRACTITIONER

## 2019-06-21 PROCEDURE — 96372 THER/PROPH/DIAG INJ SC/IM: CPT | Mod: S$GLB,,, | Performed by: EMERGENCY MEDICINE

## 2019-06-21 PROCEDURE — 87880 STREP A ASSAY W/OPTIC: CPT | Mod: QW,S$GLB,, | Performed by: NURSE PRACTITIONER

## 2019-06-21 RX ORDER — BETAMETHASONE SODIUM PHOSPHATE AND BETAMETHASONE ACETATE 3; 3 MG/ML; MG/ML
9 INJECTION, SUSPENSION INTRA-ARTICULAR; INTRALESIONAL; INTRAMUSCULAR; SOFT TISSUE
Status: COMPLETED | OUTPATIENT
Start: 2019-06-21 | End: 2019-06-21

## 2019-06-21 RX ORDER — AZITHROMYCIN 250 MG/1
TABLET, FILM COATED ORAL
Qty: 6 TABLET | Refills: 0 | Status: SHIPPED | OUTPATIENT
Start: 2019-06-21

## 2019-06-21 RX ADMIN — BETAMETHASONE SODIUM PHOSPHATE AND BETAMETHASONE ACETATE 9 MG: 3; 3 INJECTION, SUSPENSION INTRA-ARTICULAR; INTRALESIONAL; INTRAMUSCULAR; SOFT TISSUE at 12:06

## 2019-06-21 NOTE — PATIENT INSTRUCTIONS
Pharyngitis: Strep (Presumed)    You have pharyngitis (sore throat). The cause is thought to be the streptococcus, or strep, bacterium. Strep throat infection can cause throat pain that is worse when swallowing, aching all over, headache, and fever. The infection may be spread by coughing, kissing, or touching others after touching your mouth or nose. Antibiotic medications are given to treat the infection.  Home care  · Rest at home. Drink plenty of fluids to avoid dehydration.  · No work or school for the first 2 days of taking the antibiotics. After this time, you will not be contagious. You can then return to work or school if you are feeling better.   · The antibiotic medication must be taken for the full 10 days, even if you feel better. This is very important to ensure the infection is treated. It is also important to prevent drug-resistant organisms from developing. If you were given an antibiotic shot, no more antibiotics are needed.  · You may use acetaminophen or ibuprofen to control pain or fever, unless another medicine was prescribed for this. If you have chronic liver or kidney disease or ever had a stomach ulcer or GI bleeding, talk with your doctor before using these medicines.  · Throat lozenges or a throat-numbing sprays can help reduce throat pain. Gargling with warm salt water can also help. Dissolve 1/2 teaspoon of salt in 1 8 ounce glass of warm water.   · Avoid salty or spicy foods, which can irritate the throat.  Follow-up care  Follow up with your healthcare provider or our staff if you are not improving over the next week.  When to seek medical advice  Call your healthcare provider right away if any of these occur:  · Fever as directed by your doctor.   · New or worsening ear pain, sinus pain, or headache  · Painful lumps in the back of neck  · Stiff neck  · Lymph nodes are getting larger  · Inability to swallow liquids, excessive drooling, or inability to open mouth wide due to throat  pain  · Signs of dehydration (very dark urine or no urine, sunken eyes, dizziness)  · Trouble breathing or noisy breathing  · Muffled voice  · New rash  Date Last Reviewed: 4/13/2015  © 9208-9193 [employer]. [address] [place] [address], [place] [address]. All rights reserved. This information is not intended as a substitute for professional medical care. Always follow your healthcare professional's instructions.      -Strep swab is negative in the clinic today.  -Flonase daily.  -Warm salt water rinses.  -5 days of antibiotics.  -Alternate tylenol and motrin as needed for fever and body aches.  Please follow up with your Primary care provider within 2-5 days if your signs and symptoms have not resolved or worsen.     If your condition worsens or fails to improve we recommend that you receive another evaluation at the emergency room immediately or contact your primary medical clinic to discuss your concerns.   You must understand that you have received an Urgent Care treatment only and that you may be released before all of your medical problems are known or treated. You, the patient, will arrange for follow up care as instructed.

## 2019-06-21 NOTE — PROGRESS NOTES
"Subjective:       Patient ID: Heber Silva is a 34 y.o. male.    Vitals:  height is 6' 1" (1.854 m) and weight is 106.6 kg (235 lb). His tympanic temperature is 99.6 °F (37.6 °C). His blood pressure is 132/87 and his pulse is 102. His respiration is 18 and oxygen saturation is 97%.     Chief Complaint: Sore Throat    Pt c/o of sore throat, congestion and cough x5 days, possible exposure to strep.  Patient also complains of worsening fever and body aches.  Patient has been taking Mucinex with sinus pressure with mild relief.    Sore Throat    This is a new problem. The current episode started in the past 7 days. The problem has been gradually worsening. The pain is worse on the right side. There has been no fever. The pain is at a severity of 8/10 (tongue pain ). Associated symptoms include congestion, coughing and headaches. Pertinent negatives include no neck pain, trouble swallowing or vomiting. He has had exposure to strep. Exposure to: Possible exposure . He has tried acetaminophen (Mucinex, Advil) for the symptoms. The treatment provided no relief.       Constitution: Positive for chills and sweating.   HENT: Positive for congestion, sinus pain, sinus pressure and sore throat. Negative for trouble swallowing.    Neck: Negative for neck pain and neck stiffness.   Respiratory: Positive for cough.    Gastrointestinal: Negative for vomiting.   Neurological: Positive for headaches. Negative for dizziness and light-headedness.       Objective:      Physical Exam   Constitutional: He is oriented to person, place, and time. He appears well-developed and well-nourished. He is cooperative.  Non-toxic appearance. He does not appear ill. No distress.   HENT:   Head: Normocephalic and atraumatic.   Right Ear: Hearing, external ear and ear canal normal. Tympanic membrane is injected.   Left Ear: Hearing, external ear and ear canal normal. Tympanic membrane is injected.   Nose: Mucosal edema and rhinorrhea present. No nasal " deformity. No epistaxis. Right sinus exhibits no maxillary sinus tenderness and no frontal sinus tenderness. Left sinus exhibits no maxillary sinus tenderness and no frontal sinus tenderness.   Mouth/Throat: Uvula is midline and mucous membranes are normal. No trismus in the jaw. Normal dentition. No uvula swelling. Posterior oropharyngeal erythema present. Tonsils are 2+ on the right. Tonsils are 1+ on the left. Tonsillar exudate.   Eyes: Conjunctivae and lids are normal. No scleral icterus.   Sclera clear bilat   Neck: Trachea normal, full passive range of motion without pain and phonation normal. Neck supple.   Cardiovascular: Normal rate, regular rhythm, normal heart sounds, intact distal pulses and normal pulses.   Pulmonary/Chest: Effort normal and breath sounds normal. No respiratory distress.   Abdominal: Soft. Normal appearance and bowel sounds are normal. He exhibits no distension. There is no tenderness.   Musculoskeletal: Normal range of motion. He exhibits no edema or deformity.   Lymphadenopathy:        Head (right side): Tonsillar adenopathy present. No submental and no submandibular adenopathy present.        Head (left side): Tonsillar adenopathy present. No submental and no submandibular adenopathy present.   Neurological: He is alert and oriented to person, place, and time. He exhibits normal muscle tone. Coordination normal.   Skin: Skin is warm, dry and intact. He is not diaphoretic. No pallor.   Psychiatric: He has a normal mood and affect. His speech is normal and behavior is normal. Judgment and thought content normal. Cognition and memory are normal.   Nursing note and vitals reviewed.        Results for orders placed or performed in visit on 06/21/19   POCT rapid strep A   Result Value Ref Range    Rapid Strep A Screen Negative Negative     Acceptable Yes        Assessment:       1. Exudative tonsillitis        Plan:         Exudative tonsillitis  -     POCT rapid strep A  -      betamethasone acetate-betamethasone sodium phosphate injection 9 mg  -     azithromycin (ZITHROMAX Z-ROSANGELA) 250 MG tablet; Take 2 tablets (500 mg) on  Day 1,  followed by 1 tablet (250 mg) once daily on Days 2 through 5.  Dispense: 6 tablet; Refill: 0      Patient Instructions     Pharyngitis: Strep (Presumed)    You have pharyngitis (sore throat). The cause is thought to be the streptococcus, or strep, bacterium. Strep throat infection can cause throat pain that is worse when swallowing, aching all over, headache, and fever. The infection may be spread by coughing, kissing, or touching others after touching your mouth or nose. Antibiotic medications are given to treat the infection.  Home care  · Rest at home. Drink plenty of fluids to avoid dehydration.  · No work or school for the first 2 days of taking the antibiotics. After this time, you will not be contagious. You can then return to work or school if you are feeling better.   · The antibiotic medication must be taken for the full 10 days, even if you feel better. This is very important to ensure the infection is treated. It is also important to prevent drug-resistant organisms from developing. If you were given an antibiotic shot, no more antibiotics are needed.  · You may use acetaminophen or ibuprofen to control pain or fever, unless another medicine was prescribed for this. If you have chronic liver or kidney disease or ever had a stomach ulcer or GI bleeding, talk with your doctor before using these medicines.  · Throat lozenges or a throat-numbing sprays can help reduce throat pain. Gargling with warm salt water can also help. Dissolve 1/2 teaspoon of salt in 1 8 ounce glass of warm water.   · Avoid salty or spicy foods, which can irritate the throat.  Follow-up care  Follow up with your healthcare provider or our staff if you are not improving over the next week.  When to seek medical advice  Call your healthcare provider right away if any of these  occur:  · Fever as directed by your doctor.   · New or worsening ear pain, sinus pain, or headache  · Painful lumps in the back of neck  · Stiff neck  · Lymph nodes are getting larger  · Inability to swallow liquids, excessive drooling, or inability to open mouth wide due to throat pain  · Signs of dehydration (very dark urine or no urine, sunken eyes, dizziness)  · Trouble breathing or noisy breathing  · Muffled voice  · New rash  Date Last Reviewed: 4/13/2015 © 2000-2017 [employer]. [address] [place] [address]. All rights reserved. This information is not intended as a substitute for professional medical care. Always follow your healthcare professional's instructions.      -Strep swab is negative in the clinic today.  -Flonase daily.  -Warm salt water rinses.  -5 days of antibiotics.  -Alternate tylenol and motrin as needed for fever and body aches.  Please follow up with your Primary care provider within 2-5 days if your signs and symptoms have not resolved or worsen.     If your condition worsens or fails to improve we recommend that you receive another evaluation at the emergency room immediately or contact your primary medical clinic to discuss your concerns.   You must understand that you have received an Urgent Care treatment only and that you may be released before all of your medical problems are known or treated. You, the patient, will arrange for follow up care as instructed.

## 2019-07-18 ENCOUNTER — OFFICE VISIT (OUTPATIENT)
Dept: URGENT CARE | Facility: CLINIC | Age: 34
End: 2019-07-18
Payer: COMMERCIAL

## 2019-07-18 VITALS
WEIGHT: 230 LBS | OXYGEN SATURATION: 99 % | TEMPERATURE: 99 F | SYSTOLIC BLOOD PRESSURE: 132 MMHG | RESPIRATION RATE: 16 BRPM | HEART RATE: 103 BPM | HEIGHT: 73 IN | BODY MASS INDEX: 30.48 KG/M2 | DIASTOLIC BLOOD PRESSURE: 76 MMHG
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
## 2019-07-18 DIAGNOSIS — S76.312A TEAR OF LEFT HAMSTRING, INITIAL ENCOUNTER: Primary | ICD-10-CM

## 2019-07-18 PROCEDURE — 99203 PR OFFICE/OUTPT VISIT, NEW, LEVL III, 30-44 MIN: ICD-10-PCS | Mod: S$GLB,,, | Performed by: PHYSICIAN ASSISTANT

## 2019-07-18 PROCEDURE — 99203 OFFICE O/P NEW LOW 30 MIN: CPT | Mod: S$GLB,,, | Performed by: PHYSICIAN ASSISTANT

## 2019-07-18 NOTE — PROGRESS NOTES
Subjective:       Patient ID: Heber Silva is a 34 y.o. male.    Chief Complaint: Leg Injury (Left Hamstring Injury)    NEW WC INJ ( DOI 09-27-18 ) While running up an onramp with full gear he stumbled and injured his left leg. After MRI results - torn Hamstring muscle of the left leg. Surgical repair Nov 16 , 2018 and went to rehab until July 2019. Needs release to return to work. Pain score today is 0/10 with No Meds, No Complaints. RADHA    Review of Systems   Constitution: Negative for chills and fever.   HENT: Negative for hearing loss and nosebleeds.    Eyes: Negative for blurred vision and redness.   Cardiovascular: Negative for chest pain and syncope.   Respiratory: Negative for cough and shortness of breath.    Hematologic/Lymphatic: Negative for bleeding problem.   Skin: Negative for color change and rash.   Musculoskeletal: Negative for back pain, joint pain, joint swelling, muscle weakness, neck pain and stiffness.   Gastrointestinal: Negative for abdominal pain.   Neurological: Negative for loss of balance, numbness and paresthesias.   Psychiatric/Behavioral: The patient is not nervous/anxious.    All other systems reviewed and are negative.      Objective:      Physical Exam   Constitutional: He appears well-developed and well-nourished. He is active. No distress.   Normal gait   HENT:   Head: Normocephalic and atraumatic.   Right Ear: Hearing and external ear normal.   Left Ear: Hearing and external ear normal.   Nose: Nose normal. No nasal deformity. No epistaxis.   Mouth/Throat: Oropharynx is clear and moist and mucous membranes are normal.   Eyes: Conjunctivae and lids are normal. No scleral icterus.   Neck: Trachea normal and normal range of motion.   Cardiovascular: Intact distal pulses and normal pulses.   Pulmonary/Chest: Effort normal. No stridor. No respiratory distress.   Musculoskeletal:        Left knee: Normal. He exhibits normal range of motion and no swelling. No tenderness found.         Left upper leg: Normal. He exhibits no tenderness, no swelling and no deformity.   Well-healed surgical scar left posterior thigh   Neurological: He is alert. He has normal strength. He is not disoriented. No sensory deficit. GCS eye subscore is 4. GCS verbal subscore is 5. GCS motor subscore is 6.   Skin: Skin is warm, dry and intact. Capillary refill takes less than 2 seconds. He is not diaphoretic.   Psychiatric: He has a normal mood and affect. His speech is normal and behavior is normal. He is attentive.   Nursing note and vitals reviewed.      Assessment:       1. Tear of left hamstring, initial encounter    2. Work related injury        Plan:                Restrictions: Regular Duty, Discharged from Occupational Health  Follow up if symptoms worsen or fail to improve.

## 2019-07-18 NOTE — LETTER
[facility]  [address], [address]  [facility] [address]  Phone: [phone]  Fax: [phone]  [facility]: [id number]    Pt Name: Heber Silva  Injury Date: 09/27/2018   Employee ID: [id number] Date of First Treatment: 07/18/2019   Company: [employer]      Appointment Time:  Arrived: 1:17 PM   Provider: Travis Topete PA-C Time Out:  3:25 PM     Office Treatment:   1. Tear of left hamstring, initial encounter    2. Work related injury               Restrictions: Regular Duty, Discharged from Occupational Health            SH

## 2019-08-15 ENCOUNTER — OFFICE VISIT (OUTPATIENT)
Dept: URGENT CARE | Facility: CLINIC | Age: 34
End: 2019-08-15
Payer: COMMERCIAL

## 2019-08-15 VITALS
OXYGEN SATURATION: 97 % | RESPIRATION RATE: 20 BRPM | HEART RATE: 85 BPM | DIASTOLIC BLOOD PRESSURE: 82 MMHG | BODY MASS INDEX: 31.14 KG/M2 | WEIGHT: 235 LBS | HEIGHT: 73 IN | TEMPERATURE: 99 F | SYSTOLIC BLOOD PRESSURE: 127 MMHG

## 2019-08-15 DIAGNOSIS — J02.9 SORE THROAT: Primary | ICD-10-CM

## 2019-08-15 LAB
CTP QC/QA: YES
S PYO RRNA THROAT QL PROBE: NEGATIVE

## 2019-08-15 PROCEDURE — 3008F BODY MASS INDEX DOCD: CPT | Mod: CPTII,S$GLB,, | Performed by: FAMILY MEDICINE

## 2019-08-15 PROCEDURE — 87880 STREP A ASSAY W/OPTIC: CPT | Mod: QW,S$GLB,, | Performed by: FAMILY MEDICINE

## 2019-08-15 PROCEDURE — 99213 PR OFFICE/OUTPT VISIT, EST, LEVL III, 20-29 MIN: ICD-10-PCS | Mod: S$GLB,,, | Performed by: FAMILY MEDICINE

## 2019-08-15 PROCEDURE — 99213 OFFICE O/P EST LOW 20 MIN: CPT | Mod: S$GLB,,, | Performed by: FAMILY MEDICINE

## 2019-08-15 PROCEDURE — 3008F PR BODY MASS INDEX (BMI) DOCUMENTED: ICD-10-PCS | Mod: CPTII,S$GLB,, | Performed by: FAMILY MEDICINE

## 2019-08-15 PROCEDURE — 87880 POCT RAPID STREP A: ICD-10-PCS | Mod: QW,S$GLB,, | Performed by: FAMILY MEDICINE

## 2019-08-15 RX ORDER — FLUTICASONE PROPIONATE 50 MCG
1 SPRAY, SUSPENSION (ML) NASAL DAILY
Qty: 9.9 ML | Refills: 0 | Status: SHIPPED | OUTPATIENT
Start: 2019-08-15

## 2019-08-15 NOTE — PATIENT INSTRUCTIONS

## 2019-08-15 NOTE — PROGRESS NOTES
"Subjective:       Patient ID: Heber Silva is a 34 y.o. male.    Vitals:  height is 6' 1" (1.854 m) and weight is 106.6 kg (235 lb). His oral temperature is 98.5 °F (36.9 °C). His blood pressure is 127/82 and his pulse is 85. His respiration is 20 and oxygen saturation is 97%.     Chief Complaint: Sore Throat    This is a 34 y.o. male   with No past medical history on file.   and Past Surgical History:  No date: KIDNEY SURGERY  2018: KNEE SURGERY; Left  who presents today with a chief complaint of a sore throat he's had for the past week and a half. He's complaining of painful swallowing. He's been taking benadryl and salt water rinse to help relieve his symptoms.     Sore Throat    This is a new problem. The current episode started 1 to 4 weeks ago. The problem has been gradually worsening. The pain is worse on the right side. There has been no fever. The pain is at a severity of 6/10. The pain is severe. Associated symptoms include trouble swallowing. Pertinent negatives include no congestion, coughing, ear pain, shortness of breath, stridor or vomiting. He has tried gargles (benadryl) for the symptoms. The treatment provided no relief.       Constitution: Negative for chills, sweating, fatigue and fever.   HENT: Positive for sore throat and trouble swallowing. Negative for ear pain, congestion, sinus pain, sinus pressure and voice change.    Neck: Positive for painful lymph nodes.   Eyes: Negative for eye redness.   Respiratory: Negative for chest tightness, cough, sputum production, bloody sputum, COPD, shortness of breath, stridor, wheezing and asthma.    Gastrointestinal: Negative for nausea and vomiting.   Musculoskeletal: Negative for muscle ache.   Skin: Negative for rash.   Allergic/Immunologic: Negative for seasonal allergies and asthma.   Hematologic/Lymphatic: Positive for swollen lymph nodes.       Objective:      Physical Exam   Constitutional: He appears well-developed and well-nourished.   HENT: "   Head: Normocephalic and atraumatic.   Nose: Mucosal edema and rhinorrhea present. Right sinus exhibits no maxillary sinus tenderness. Left sinus exhibits no maxillary sinus tenderness.   Mouth/Throat: Posterior oropharyngeal erythema present. No oropharyngeal exudate.   Eyes: Pupils are equal, round, and reactive to light. EOM are normal.   Neck: Normal range of motion. Neck supple.   Cardiovascular: Normal rate, regular rhythm and normal heart sounds.   Pulmonary/Chest: Effort normal and breath sounds normal.   Lymphadenopathy:     He has no cervical adenopathy.   Nursing note and vitals reviewed.      Results for orders placed or performed in visit on 08/15/19   POCT rapid strep A   Result Value Ref Range    Rapid Strep A Screen Negative Negative     Acceptable Yes      Assessment:       1. Sore throat        Plan:         Sore throat  -     POCT rapid strep A  -     fluticasone propionate (FLONASE) 50 mcg/actuation nasal spray; 1 spray (50 mcg total) by Each Nostril route once daily.  Dispense: 9.9 mL; Refill: 0  -     benzocaine-menthol 6-10 mg lozenge; Take 1 lozenge by mouth every 2 (two) hours as needed for Pain.  Dispense: 18 tablet; Refill: 0

## 2019-10-23 ENCOUNTER — OFFICE VISIT (OUTPATIENT)
Dept: URGENT CARE | Facility: CLINIC | Age: 34
End: 2019-10-23
Payer: COMMERCIAL

## 2019-10-23 DIAGNOSIS — M25.562 ACUTE PAIN OF LEFT KNEE: ICD-10-CM

## 2019-10-23 DIAGNOSIS — S83.412A SPRAIN OF MEDIAL COLLATERAL LIGAMENT OF LEFT KNEE, INITIAL ENCOUNTER: Primary | ICD-10-CM

## 2019-10-23 PROCEDURE — 73562 X-RAY EXAM OF KNEE 3: CPT | Mod: FY,LT,S$GLB, | Performed by: RADIOLOGY

## 2019-10-23 PROCEDURE — 73562 XR KNEE 3 VIEW LEFT: ICD-10-PCS | Mod: FY,LT,S$GLB, | Performed by: RADIOLOGY

## 2019-10-23 PROCEDURE — 99213 PR OFFICE/OUTPT VISIT, EST, LEVL III, 20-29 MIN: ICD-10-PCS | Mod: S$GLB,,, | Performed by: FAMILY MEDICINE

## 2019-10-23 PROCEDURE — 99213 OFFICE O/P EST LOW 20 MIN: CPT | Mod: S$GLB,,, | Performed by: FAMILY MEDICINE

## 2019-10-23 NOTE — PROGRESS NOTES
Subjective:       Patient ID: Heber Silva is a 34 y.o. male.    Chief Complaint: Knee Injury (left 10/23/19)    Pt is a [employer] for [employer]. Pt states on 5/23/19  @ 0630 while walking through a school yard responding to an alarm he slipped in a hole causing a sharp pain and swelling to left knee. Prior injury to left hamstring.  Pain is 7/10. Ambulatory. MjB    34-year-old  with past medical history of lateral hamstring repair presents to Occupational Medicine a few hours after injuring his left knee after stepping in a hole.  Injury was sustained while  was walking through a school playground in the dark inspecting a school post fire alarm. Patient denies falling or any twisting. Pt was wearing 60 lbs of equipment including heavy boots. Reports pain in his left knee when pulling his leg up to walk.    Knee Injury   This is a new problem. The current episode started today. The problem occurs constantly. The problem has been unchanged. Associated symptoms include joint swelling. Pertinent negatives include no abdominal pain, chest pain, chills, fever, headaches, nausea, rash, sore throat or vomiting. The symptoms are aggravated by walking. He has tried nothing for the symptoms.     Review of Systems   Constitution: Negative for chills and fever.   HENT: Negative for sore throat.    Eyes: Negative for blurred vision.   Cardiovascular: Negative for chest pain.   Respiratory: Negative for shortness of breath.    Skin: Negative for rash.   Musculoskeletal: Positive for joint pain and joint swelling. Negative for back pain.   Gastrointestinal: Negative for abdominal pain, diarrhea, nausea and vomiting.   Neurological: Negative for headaches.   Psychiatric/Behavioral: The patient is not nervous/anxious.        Objective:      Physical Exam   Constitutional: He is oriented to person, place, and time. He appears well-developed and well-nourished.   HENT:   Head: Normocephalic and atraumatic.   Eyes:  Pupils are equal, round, and reactive to light. Conjunctivae and EOM are normal.   Neck: Normal range of motion. Neck supple.   Musculoskeletal: Normal range of motion.        Left knee: He exhibits normal range of motion, no swelling, no effusion, no ecchymosis, no deformity, no laceration, no erythema and normal alignment. Tenderness found. MCL tenderness noted.        Legs:  FROM. No swelling. Lateral and Medial ligaments appears intact. Point tenderness to palpation at site of medial lateral ligament insertion.   Neurological: He is alert and oriented to person, place, and time.   Skin: Skin is warm and dry.   Psychiatric: He has a normal mood and affect. His behavior is normal. Judgment and thought content normal.       Assessment:       1. Sprain of medial collateral ligament of left knee, initial encounter    2. Acute pain of left knee        Plan:       Dr. Light met with patient.  Long discussion.  Patient advised to rest, use warm soaks, use over-the-counter Advil or aleve AND not reaggravate injury to let the body heal itself.     Patient Instructions: Use splint as directed   Restrictions: Avoid frequent bending/lifting/twisting, No lifting/pushing/pulling more than 10 lbs  Follow up in about 5 days (around 10/28/2019).

## 2019-10-23 NOTE — LETTER
[facility]  [address], [address]  [place] [address]  Phone: [phone]  Fax: [phone]  [facility]: [id number]    Pt Name: Heber Silva  Injury Date: 10/18/2018   Employee ID: [id number] Date of  Treatment: 10/23/2019   Company: [employer]      Appointment Time: 09:05 AM Arrived: 9:17am   Provider: Julio Ricks MD Time Out:11:45     Office Treatment:     1. Sprain of medial collateral ligament of left knee, initial encounter    2. Acute pain of left knee          Patient Instructions: Use splint as directed    Restrictions: Avoid frequent bending/lifting/twisting, No lifting/pushing/pulling more than 10 lbs     Return Appointment: 10/28/2019 at 11:30am  LN

## 2019-10-28 ENCOUNTER — OFFICE VISIT (OUTPATIENT)
Dept: URGENT CARE | Facility: CLINIC | Age: 34
End: 2019-10-28
Payer: COMMERCIAL

## 2019-10-28 DIAGNOSIS — S83.412A SPRAIN OF MEDIAL COLLATERAL LIGAMENT OF LEFT KNEE, INITIAL ENCOUNTER: ICD-10-CM

## 2019-10-28 DIAGNOSIS — M25.562 ACUTE PAIN OF LEFT KNEE: Primary | ICD-10-CM

## 2019-10-28 PROCEDURE — 99214 OFFICE O/P EST MOD 30 MIN: CPT | Mod: S$GLB,,, | Performed by: PREVENTIVE MEDICINE

## 2019-10-28 PROCEDURE — 99214 PR OFFICE/OUTPT VISIT, EST, LEVL IV, 30-39 MIN: ICD-10-PCS | Mod: S$GLB,,, | Performed by: PREVENTIVE MEDICINE

## 2019-10-28 NOTE — PROGRESS NOTES
Subjective:       Patient ID: Heber Silva is a 34 y.o. male.    Chief Complaint: Knee Injury (right)    W/C f/u- Right knee (DOI 10/23/2019). Patient states he feels like his knee is feeling a bit better and is still wearing knee brace. He states he still has pain when lifting his leg and doing other things. Pain level 3/10 on today. [place]    Knee Injury   This is a recurrent problem. The current episode started in the past 7 days. The problem occurs intermittently. The problem has been gradually improving. Pertinent negatives include no arthralgias, fatigue, swollen glands, urinary symptoms or visual change. The symptoms are aggravated by twisting (lifting his leg). He has tried nothing for the symptoms.     Review of Systems   Constitution: Negative. Negative for fatigue.   HENT: Negative.    Eyes: Negative.    Cardiovascular: Negative.    Respiratory: Negative.    Endocrine: Negative.    Skin: Negative.    Musculoskeletal: Positive for joint pain. Negative for arthralgias.   Gastrointestinal: Negative.    Genitourinary: Negative.    Neurological: Negative.    Psychiatric/Behavioral: Negative.        Objective:      Physical Exam   Constitutional: He is oriented to person, place, and time. He appears well-developed and well-nourished.   HENT:   Head: Normocephalic.   Eyes: Pupils are equal, round, and reactive to light.   Neck: Normal range of motion.   Cardiovascular: Normal rate.   Pulmonary/Chest: Effort normal.   Musculoskeletal: Normal range of motion.        Left knee: He exhibits normal range of motion, no swelling, no effusion, no ecchymosis, no deformity, no laceration, no erythema, normal alignment and no LCL laxity. Tenderness found. No medial joint line, no lateral joint line, no MCL, no LCL and no patellar tendon tenderness noted.        Legs:  Mild pain with full range of motion of left knee. No swelling or ecchymosis noted. Anterior and posterior drawer signs are negative. Rojelio's test negative.    Neurological: He is alert and oriented to person, place, and time.   Skin: Skin is warm and dry.   Psychiatric: He has a normal mood and affect.   Nursing note and vitals reviewed.      Assessment:       1. Acute pain of left knee    2. Sprain of medial collateral ligament of left knee, initial encounter        Plan:     patient will be doing stretching exercises demonstrated for two weeks.        Patient Instructions: Daily home exercises/warm soaks   Restrictions: Regular Duty(may resume regular work. )  Follow up in about 2 weeks (around 11/11/2019).

## 2019-10-28 NOTE — LETTER
[facility]  [address], [address]  [facility] [address]  Phone: [phone]  Fax: [phone]  [facility]: [email or web address]    Pt Name: Heber Silva  Injury Date: 10/18/2018   Employee ID: [id number] Date of Treatment: 10/28/2019   Company: [employer]      Appointment Time: 11:30 AM Arrived: 11:07 AM   Provider: David Light MD Time Out: 12:25 PM     Office Treatment:   1. Acute pain of left knee    2. Sprain of medial collateral ligament of left knee, initial encounter          Patient Instructions: Daily home exercises/warm soaks    Restrictions: Regular Duty(may resume regular work. )     Return Appointment: 11/11/2019 at 10:00 AM     SH

## 2020-03-22 ENCOUNTER — OFFICE VISIT (OUTPATIENT)
Dept: URGENT CARE | Facility: CLINIC | Age: 35
End: 2020-03-22
Payer: COMMERCIAL

## 2020-03-22 VITALS
BODY MASS INDEX: 30.48 KG/M2 | HEIGHT: 73 IN | HEART RATE: 89 BPM | WEIGHT: 230 LBS | OXYGEN SATURATION: 97 % | TEMPERATURE: 98 F | SYSTOLIC BLOOD PRESSURE: 138 MMHG | RESPIRATION RATE: 20 BRPM | DIASTOLIC BLOOD PRESSURE: 79 MMHG

## 2020-03-22 DIAGNOSIS — M54.32 SCIATICA OF LEFT SIDE: ICD-10-CM

## 2020-03-22 DIAGNOSIS — S39.012A LUMBAR STRAIN, INITIAL ENCOUNTER: Primary | ICD-10-CM

## 2020-03-22 PROCEDURE — 96372 PR INJECTION,THERAP/PROPH/DIAG2ST, IM OR SUBCUT: ICD-10-PCS | Mod: S$GLB,,, | Performed by: FAMILY MEDICINE

## 2020-03-22 PROCEDURE — 99214 PR OFFICE/OUTPT VISIT, EST, LEVL IV, 30-39 MIN: ICD-10-PCS | Mod: 25,S$GLB,, | Performed by: FAMILY MEDICINE

## 2020-03-22 PROCEDURE — 99214 OFFICE O/P EST MOD 30 MIN: CPT | Mod: 25,S$GLB,, | Performed by: FAMILY MEDICINE

## 2020-03-22 PROCEDURE — 96372 THER/PROPH/DIAG INJ SC/IM: CPT | Mod: S$GLB,,, | Performed by: FAMILY MEDICINE

## 2020-03-22 RX ORDER — HYDROCODONE BITARTRATE AND ACETAMINOPHEN 5; 325 MG/1; MG/1
1 TABLET ORAL EVERY 6 HOURS PRN
Qty: 12 TABLET | Refills: 0 | Status: SHIPPED | OUTPATIENT
Start: 2020-03-22

## 2020-03-22 RX ORDER — KETOROLAC TROMETHAMINE 30 MG/ML
30 INJECTION, SOLUTION INTRAMUSCULAR; INTRAVENOUS
Status: COMPLETED | OUTPATIENT
Start: 2020-03-22 | End: 2020-03-22

## 2020-03-22 RX ORDER — CYCLOBENZAPRINE HCL 10 MG
10 TABLET ORAL 3 TIMES DAILY PRN
Qty: 21 TABLET | Refills: 0 | Status: SHIPPED | OUTPATIENT
Start: 2020-03-22 | End: 2020-04-01

## 2020-03-22 RX ORDER — IBUPROFEN 800 MG/1
800 TABLET ORAL EVERY 8 HOURS PRN
Qty: 30 TABLET | Refills: 1 | Status: SHIPPED | OUTPATIENT
Start: 2020-03-22 | End: 2021-03-22

## 2020-03-22 RX ADMIN — KETOROLAC TROMETHAMINE 30 MG: 30 INJECTION, SOLUTION INTRAMUSCULAR; INTRAVENOUS at 01:03

## 2020-03-22 NOTE — PATIENT INSTRUCTIONS
Back Sprain or Strain    Injury to the muscles (strain) or ligaments (sprain) around the spine can be troubling. Injury may occur after a sudden forceful twisting or bending force such as in a car accident, after a simple awkward movement, or after lifting something heavy with poor body positioning. In any case, muscle spasm is often present and adds to the pain.  Thankfully, most people feel better in 1 to 2 weeks, and most of the rest in 1 to 2 months. Most people can remain active. Unless you had a forceful or traumatic physical injury such as a car accident or fall, X-rays may not be ordered for the first evaluation of a back sprain or strain. If pain continues and does not respond to medical treatment, your healthcare provider may then order X-rays and other tests.  Home care  The following guidelines will help you care for your injury at home:  · When in bed, try to find a comfortable position. A firm mattress is best. Try lying flat on your back with pillows under your knees. You can also try lying on your side with your knees bent up toward your chest and a pillow between your knees.  · Don't sit for long periods. Try not to take long car rides or take other trips that have you sitting for a long time. This puts more stress on the lower back than standing or walking.  · During the first 24 to 72 hours after an injury or flare-up, apply an ice pack to the painful area for 20 minutes. Then remove it for 20 minutes. Do this for 60 to 90 minutes, or several times a day. This will reduce swelling and pain. Be sure to wrap the ice pack in a thin towel or plastic to protect your skin.  · You can start with ice, then switch to heat. Heat from a hot shower, hot bath, or heating pad reduces pain and works well for muscle spasms. Put heat on the painful area for 20 minutes, then remove for 20 minutes. Do this for 60 to 90 minutes, or several times a day. Do not use a heating pad while sleeping. It can burn the  skin.  · You can alternate the ice and heat. Talk with your healthcare provider to find out the best treatment or therapy for your back pain.  · Therapeutic massage will help relax the back muscles without stretching them.  · Be aware of safe lifting methods. Do not lift anything over 15 pounds until all of the pain is gone.  Medicines  Talk to your healthcare provider before using medicines, especially if you have other health problems or are taking other medicines.  · You may use acetaminophen or ibuprofen to control pain, unless another pain medicine was prescribed. If you have chronic conditions like diabetes, liver or kidney disease, stomach ulcers, or gastrointestinal bleeding, or are taking blood-thinner medicines, talk with your doctor before taking any medicines.  · Be careful if you are given prescription medicines, narcotics, or medicine for muscle spasm. They can cause drowsiness, and affect your coordination, reflexes, and judgment. Do not drive or operate heavy machinery when taking these types of medicines. Only take pain medicine as prescribed by your healthcare provider.  Follow-up care  Follow up with your healthcare provider, or as advised. You may need physical therapy or more tests if your symptoms get worse.  If you had X-rays your healthcare provider may be checking for any broken bones, breaks, or fractures. Bruises and sprains can sometimes hurt as much as a fracture. These injuries can take time to heal completely. If your symptoms dont improve or they get worse, talk with your healthcare provider. You may need a repeat X-ray or other tests.  Call 911  Call for emergency care if any of the following occur:  · Trouble breathing  · Confused  · Very drowsy or trouble awakening  · Fainting or loss of consciousness  · Rapid or very slow heart rate  · Loss of bowel or bladder control  When to seek medical advice  Call your healthcare provider right away if any of the following occur:  · Pain  gets worse or spreads to your arms or legs  · Weakness or numbness in one or both arms or legs  · Numbness in the groin or genital area  Date Last Reviewed: 6/1/2016  © 2655-0073 [employer]. [address] [place] [address], [place] [address]. All rights reserved. This information is not intended as a substitute for professional medical care. Always follow your healthcare professional's instructions.

## 2020-03-22 NOTE — PROGRESS NOTES
"Subjective:       Patient ID: Heber Silva is a 34 y.o. male.    Vitals:  height is 6' 1" (1.854 m) and weight is 104.3 kg (230 lb). His oral temperature is 98.1 °F (36.7 °C). His blood pressure is 138/79 and his pulse is 89. His respiration is 20 and oxygen saturation is 97%.     Chief Complaint: Back Pain    This is a 34 y.o. male   with No past medical history on file.   and Past Surgical History:  No date: KIDNEY SURGERY  2018: KNEE SURGERY; Left  who presents today with a chief complaint of back pain that began a day ago. He says that he pulled a muscle in his lower back while in the tub. He is a  and does do heavy lifting. The pain radiates down both legs. He's been taking duexis to help relieve his symptoms.     Back Pain   This is a new problem. The current episode started yesterday. The problem occurs constantly. The problem has been gradually worsening since onset. The pain is present in the lumbar spine. The pain radiates to the left thigh and right thigh. The pain is at a severity of 10/10. The pain is severe. The pain is the same all the time. Stiffness is present all day. Pertinent negatives include no abdominal pain, bladder incontinence, bowel incontinence, dysuria or numbness. Treatments tried: duexis. The treatment provided no relief.       Constitution: Negative for fatigue.   Gastrointestinal: Negative for abdominal pain and bowel incontinence.   Genitourinary: Negative for dysuria, urgency, bladder incontinence and hematuria.   Musculoskeletal: Positive for back pain. Negative for muscle cramps and history of spine disorder.   Skin: Negative for rash.   Neurological: Negative for coordination disturbances, numbness and tingling.       Objective:      Physical Exam   Constitutional: He appears well-developed and well-nourished.   HENT:   Head: Normocephalic and atraumatic.   Eyes: Pupils are equal, round, and reactive to light. EOM are normal.   Neck: Normal range of motion. Neck " supple.   Cardiovascular: Normal rate, regular rhythm and normal heart sounds.   Pulmonary/Chest: Effort normal and breath sounds normal.   Musculoskeletal: He exhibits tenderness (paralumbar muscles bilaterally. straight leg raise bilaterally).   Nursing note and vitals reviewed.        Assessment:       1. Lumbar strain, initial encounter    2. Sciatica of left side        Plan:         Lumbar strain, initial encounter  -     ketorolac injection 30 mg  -     cyclobenzaprine (FLEXERIL) 10 MG tablet; Take 1 tablet (10 mg total) by mouth 3 (three) times daily as needed.  Dispense: 21 tablet; Refill: 0  -     ibuprofen (ADVIL,MOTRIN) 800 MG tablet; Take 1 tablet (800 mg total) by mouth every 8 (eight) hours as needed for Pain (with food).  Dispense: 30 tablet; Refill: 1  -     HYDROcodone-acetaminophen (NORCO) 5-325 mg per tablet; Take 1 tablet by mouth every 6 (six) hours as needed for Pain.  Dispense: 12 tablet; Refill: 0    Sciatica of left side

## 2020-08-29 ENCOUNTER — OFFICE VISIT (OUTPATIENT)
Dept: URGENT CARE | Facility: CLINIC | Age: 35
End: 2020-08-29
Payer: COMMERCIAL

## 2020-08-29 VITALS
BODY MASS INDEX: 31.14 KG/M2 | DIASTOLIC BLOOD PRESSURE: 77 MMHG | SYSTOLIC BLOOD PRESSURE: 116 MMHG | TEMPERATURE: 98 F | RESPIRATION RATE: 15 BRPM | WEIGHT: 235 LBS | HEIGHT: 73 IN | HEART RATE: 90 BPM | OXYGEN SATURATION: 97 %

## 2020-08-29 DIAGNOSIS — J20.8 ACUTE VIRAL BRONCHITIS: Primary | ICD-10-CM

## 2020-08-29 DIAGNOSIS — R05.9 COUGH: ICD-10-CM

## 2020-08-29 LAB
CTP QC/QA: YES
SARS-COV-2 RDRP RESP QL NAA+PROBE: NEGATIVE

## 2020-08-29 PROCEDURE — U0002 COVID-19 LAB TEST NON-CDC: HCPCS | Mod: S$GLB,,, | Performed by: NURSE PRACTITIONER

## 2020-08-29 PROCEDURE — 99214 PR OFFICE/OUTPT VISIT, EST, LEVL IV, 30-39 MIN: ICD-10-PCS | Mod: S$GLB,,, | Performed by: NURSE PRACTITIONER

## 2020-08-29 PROCEDURE — U0002: ICD-10-PCS | Mod: S$GLB,,, | Performed by: NURSE PRACTITIONER

## 2020-08-29 PROCEDURE — 99214 OFFICE O/P EST MOD 30 MIN: CPT | Mod: S$GLB,,, | Performed by: NURSE PRACTITIONER

## 2020-08-29 RX ORDER — ALBUTEROL SULFATE 90 UG/1
2 AEROSOL, METERED RESPIRATORY (INHALATION) EVERY 6 HOURS PRN
Qty: 18 G | Refills: 0 | Status: SHIPPED | OUTPATIENT
Start: 2020-08-29

## 2020-08-29 RX ORDER — PROMETHAZINE HYDROCHLORIDE AND DEXTROMETHORPHAN HYDROBROMIDE 6.25; 15 MG/5ML; MG/5ML
5 SYRUP ORAL NIGHTLY PRN
Qty: 80 ML | Refills: 0 | Status: SHIPPED | OUTPATIENT
Start: 2020-08-29

## 2020-08-29 NOTE — PROGRESS NOTES
"Subjective:       Patient ID: Heber Silva is a 35 y.o. male.    Vitals:  height is 6' 1" (1.854 m) and weight is 106.6 kg (235 lb). His temperature is 98.2 °F (36.8 °C). His blood pressure is 116/77 and his pulse is 90. His respiration is 15 and oxygen saturation is 97%.     Chief Complaint: Cough    Pt is a 35 y.o. male complaining of a cough. This has been going on for 4 days. PT states he has had a cough for 4 days, he describes it as clockwork because it happens at night. Pt says there is only chest congestion. Pt states that every now and then he is able to cough up mucous from his chest, and it is thick and green, but for the most part, he feels the congestion and cannot cough it up. Pt states last night was the worst, and that he was making himself sick from coughing so much that he felt like he needed to throw up.    Cough  This is a new problem. The current episode started in the past 7 days. The problem has been gradually worsening. Episode frequency: at night, constant. The cough is productive of sputum. Pertinent negatives include no chills, ear pain, eye redness, fever, hemoptysis, myalgias, rash, sore throat, shortness of breath or wheezing. The symptoms are aggravated by lying down. He has tried body position changes (Mucinex, OTC cough ) for the symptoms.       Constitution: Negative for chills, sweating, fatigue and fever.   HENT: Positive for congestion. Negative for ear pain, sinus pain, sinus pressure, sore throat and voice change.    Neck: Negative for painful lymph nodes.   Eyes: Negative for eye redness.   Respiratory: Positive for cough and sputum production. Negative for chest tightness, bloody sputum, COPD, shortness of breath, stridor, wheezing and asthma.    Gastrointestinal: Negative for nausea and vomiting.   Musculoskeletal: Negative for muscle ache.   Skin: Negative for rash.   Allergic/Immunologic: Negative for seasonal allergies and asthma.   Hematologic/Lymphatic: Negative for " swollen lymph nodes.       Objective:      Physical Exam   Constitutional: He is oriented to person, place, and time. He appears well-developed. He is cooperative.  Non-toxic appearance. He does not appear ill. No distress.   HENT:   Head: Normocephalic and atraumatic.   Ears:   Right Ear: Hearing and external ear normal.   Left Ear: Hearing and external ear normal.   Nose: Nose normal. No mucosal edema, rhinorrhea or nasal deformity. No epistaxis. Right sinus exhibits no maxillary sinus tenderness and no frontal sinus tenderness. Left sinus exhibits no maxillary sinus tenderness and no frontal sinus tenderness.   Mouth/Throat: Uvula is midline, oropharynx is clear and moist and mucous membranes are normal. No trismus in the jaw. Normal dentition. No uvula swelling. No oropharyngeal exudate, posterior oropharyngeal edema or posterior oropharyngeal erythema.   Eyes: Conjunctivae and lids are normal. No scleral icterus.   Neck: Trachea normal, full passive range of motion without pain and phonation normal. Neck supple. No neck rigidity. No edema and no erythema present.   Cardiovascular: Normal rate, regular rhythm, normal heart sounds and normal pulses.   Pulmonary/Chest: Effort normal. No accessory muscle usage or stridor. No respiratory distress. He has no decreased breath sounds. He has no wheezes. He has no rhonchi.   Increased bronchial turbulence auscultated in mid chest, which is consistent with patients complaint of congestion in area    Comments: Increased bronchial turbulence auscultated in mid chest, which is consistent with patients complaint of congestion in area    Abdominal: Normal appearance.   Musculoskeletal: Normal range of motion.         General: No deformity.   Neurological: He is alert and oriented to person, place, and time. He exhibits normal muscle tone. Coordination normal.   Skin: Skin is warm, dry, intact, not diaphoretic and not pale. Psychiatric: His speech is normal and behavior is  normal. Judgment and thought content normal.   Nursing note and vitals reviewed.    Results for orders placed or performed in visit on 08/29/20   POCT COVID-19 Rapid Screening   Result Value Ref Range    POC Rapid COVID Negative Negative     Acceptable Yes            Assessment:       1. Acute viral bronchitis    2. Cough        Plan:         Acute viral bronchitis  -     albuterol (VENTOLIN HFA) 90 mcg/actuation inhaler; Inhale 2 puffs into the lungs every 6 (six) hours as needed for Wheezing or Shortness of Breath (cough). Rescue  Dispense: 18 g; Refill: 0    Cough  -     POCT COVID-19 Rapid Screening  -     promethazine-dextromethorphan (PROMETHAZINE-DM) 6.25-15 mg/5 mL Syrp; Take 5 mLs by mouth nightly as needed (cough that keeps you awake at night).  Dispense: 80 mL; Refill: 0  -     albuterol (VENTOLIN HFA) 90 mcg/actuation inhaler; Inhale 2 puffs into the lungs every 6 (six) hours as needed for Wheezing or Shortness of Breath (cough). Rescue  Dispense: 18 g; Refill: 0

## 2020-08-29 NOTE — PATIENT INSTRUCTIONS
Return to Urgent Care or go to ER if symptoms worsen or fail to improve.  Follow up with PCP as recommended for further management.       Bronchitis, Viral (Adult)    You have a viral bronchitis. Bronchitis is inflammation and swelling of the lining of the lungs. This is often caused by an infection. Symptoms include a dry, hacking cough that is worse at night. The cough may bring up yellow-green mucus. You may also feel short of breath or wheeze. Other symptoms may include tiredness, chest discomfort, and chills.  Bronchitis that is caused by a virus is not treated with antibiotics. Instead, medicines may be given to help relieve symptoms. Symptoms can last up to 2 weeks, although the cough may last much longer.  This illness is contagious during the first few days and is spread through the air by coughing and sneezing, or by direct contact (touching the sick person and then touching your own eyes, nose, or mouth).  Most viral illnesses resolve within 10 to 14 days with rest and simple home remedies, although they may sometimes last for several weeks.  Home care  · If symptoms are severe, rest at home for the first 2 to 3 days. When you go back to your usual activities, don't let yourself get too tired.  · Do not smoke. Also avoid being exposed to secondhand smoke.  · You may use over-the-counter medicine to control fever or pain, unless another pain medicine was prescribed. (Note: If you have chronic liver or kidney disease or have ever had a stomach ulcer or gastrointestinal bleeding, talk with your healthcare provider before using these medicines. Also talk to your provider if you are taking medicine to prevent blood clots.) Aspirin should never be given to anyone younger than 18 years of age who is ill with a viral infection or fever. It may cause severe liver or brain damage.  · Your appetite may be poor, so a light diet is fine. Avoid dehydration by drinking 6 to 8 glasses of fluids per day (such as water,  soft drinks, sports drinks, juices, tea, or soup). Extra fluids will help loosen secretions in the nose and lungs.  · Over-the-counter cough, cold, and sore-throat medicines will not shorten the length of the illness, but they may help to reduce symptoms. (Note: Do not use decongestants if you have high blood pressure.)  Follow-up care  Follow up with your healthcare provider, or as advised. If you had an X-ray or ECG (electrocardiogram), a specialist will review it. You will be notified of any new findings that may affect your care.  Note: If you are age 65 or older, or if you have a chronic lung disease or condition that affects your immune system, or you smoke, talk to your healthcare provider about having pneumococcal vaccinations and a yearly influenza vaccination (flu shot).  When to seek medical advice  Call your healthcare provider right away if any of these occur:  · Fever of 100.4°F (38°C) or higher  · Coughing up increased amounts of colored sputum  · Weakness, drowsiness, headache, facial pain, ear pain, or a stiff neck  Call 911, or get immediate medical care  Contact emergency services right away if any of these occur:  · Coughing up blood  · Worsening weakness, drowsiness, headache, or stiff neck  · Trouble breathing, wheezing, or pain with breathing  Date Last Reviewed: 9/13/2015 © 2000-2017 [employer]. [address] [place] [address]. All rights reserved. This information is not intended as a substitute for professional medical care. Always follow your healthcare professional's instructions.        Using an Inhaler  Your healthcare provider may prescribe medicine that you breathe in using a metered-dose inhaler (MDI). An inhaler sends a measured amount of medicine in a fine mist.  Step 1:  · Shake the inhaler and remove the cap.  · Take a deep breath and let it out.  Step 2:  · Close your lips around the end of the inhaler mouthpiece. Or if you were told to use the  ""open-mouth" method, hold the inhaler 1 to 2 inches from your mouth.  Step 3:  · Breathe in slowly and deeply as you press down on the inhaler to release the medicine.  · Inhale fully.  Step 4:  · Hold your breath for a count of 10, or as long as you can comfortably.  · Then breathe out slowly through your mouth.  · Repeat these steps for each puff of medicine prescribed.             Important  · If the inhaler is being used for the first time or has not been used for a while, prime it as directed by the product maker. You can find important information about the medicine in the package insert. This is the paper that comes with the medicine.  · If you use more than one inhaler, make sure you know which one to use first.  · Your healthcare provider or pharmacist can show you how to use your inhaler the right way. Even if you think you are using it the right way, it is still a good idea to check.   Date Last Reviewed: 10/1/2016  © 4900-8693 [employer]. [address] [place] [address], [place] [address]. All rights reserved. This information is not intended as a substitute for professional medical care. Always follow your healthcare professional's instructions.        "

## 2021-10-18 ENCOUNTER — OFFICE VISIT (OUTPATIENT)
Dept: URGENT CARE | Facility: CLINIC | Age: 36
End: 2021-10-18
Payer: COMMERCIAL

## 2021-10-18 VITALS
WEIGHT: 230 LBS | RESPIRATION RATE: 18 BRPM | BODY MASS INDEX: 30.48 KG/M2 | SYSTOLIC BLOOD PRESSURE: 130 MMHG | HEIGHT: 73 IN | TEMPERATURE: 99 F | OXYGEN SATURATION: 99 % | HEART RATE: 98 BPM | DIASTOLIC BLOOD PRESSURE: 83 MMHG

## 2021-10-18 DIAGNOSIS — R11.2 NAUSEA AND VOMITING, INTRACTABILITY OF VOMITING NOT SPECIFIED, UNSPECIFIED VOMITING TYPE: ICD-10-CM

## 2021-10-18 DIAGNOSIS — K29.70 GASTRITIS, PRESENCE OF BLEEDING UNSPECIFIED, UNSPECIFIED CHRONICITY, UNSPECIFIED GASTRITIS TYPE: Primary | ICD-10-CM

## 2021-10-18 PROCEDURE — 3075F PR MOST RECENT SYSTOLIC BLOOD PRESS GE 130-139MM HG: ICD-10-PCS | Mod: CPTII,S$GLB,, | Performed by: PHYSICIAN ASSISTANT

## 2021-10-18 PROCEDURE — 1159F MED LIST DOCD IN RCRD: CPT | Mod: CPTII,S$GLB,, | Performed by: PHYSICIAN ASSISTANT

## 2021-10-18 PROCEDURE — 1160F PR REVIEW ALL MEDS BY PRESCRIBER/CLIN PHARMACIST DOCUMENTED: ICD-10-PCS | Mod: CPTII,S$GLB,, | Performed by: PHYSICIAN ASSISTANT

## 2021-10-18 PROCEDURE — 99213 PR OFFICE/OUTPT VISIT, EST, LEVL III, 20-29 MIN: ICD-10-PCS | Mod: S$GLB,,, | Performed by: PHYSICIAN ASSISTANT

## 2021-10-18 PROCEDURE — S0119 ONDANSETRON 4 MG: HCPCS | Mod: S$GLB,,, | Performed by: FAMILY MEDICINE

## 2021-10-18 PROCEDURE — 3008F PR BODY MASS INDEX (BMI) DOCUMENTED: ICD-10-PCS | Mod: CPTII,S$GLB,, | Performed by: PHYSICIAN ASSISTANT

## 2021-10-18 PROCEDURE — S0119 PR ONDANSETRON, ORAL, 4MG: ICD-10-PCS | Mod: S$GLB,,, | Performed by: FAMILY MEDICINE

## 2021-10-18 PROCEDURE — 3008F BODY MASS INDEX DOCD: CPT | Mod: CPTII,S$GLB,, | Performed by: PHYSICIAN ASSISTANT

## 2021-10-18 PROCEDURE — 3075F SYST BP GE 130 - 139MM HG: CPT | Mod: CPTII,S$GLB,, | Performed by: PHYSICIAN ASSISTANT

## 2021-10-18 PROCEDURE — 3079F DIAST BP 80-89 MM HG: CPT | Mod: CPTII,S$GLB,, | Performed by: PHYSICIAN ASSISTANT

## 2021-10-18 PROCEDURE — 1159F PR MEDICATION LIST DOCUMENTED IN MEDICAL RECORD: ICD-10-PCS | Mod: CPTII,S$GLB,, | Performed by: PHYSICIAN ASSISTANT

## 2021-10-18 PROCEDURE — 1160F RVW MEDS BY RX/DR IN RCRD: CPT | Mod: CPTII,S$GLB,, | Performed by: PHYSICIAN ASSISTANT

## 2021-10-18 PROCEDURE — 3079F PR MOST RECENT DIASTOLIC BLOOD PRESSURE 80-89 MM HG: ICD-10-PCS | Mod: CPTII,S$GLB,, | Performed by: PHYSICIAN ASSISTANT

## 2021-10-18 PROCEDURE — 99213 OFFICE O/P EST LOW 20 MIN: CPT | Mod: S$GLB,,, | Performed by: PHYSICIAN ASSISTANT

## 2021-10-18 RX ORDER — FINASTERIDE 1 MG/1
1 TABLET, FILM COATED ORAL DAILY
COMMUNITY

## 2021-10-18 RX ORDER — FAMOTIDINE 20 MG/1
20 TABLET, FILM COATED ORAL 2 TIMES DAILY
Qty: 30 TABLET | Refills: 0 | Status: SHIPPED | OUTPATIENT
Start: 2021-10-18

## 2021-10-18 RX ORDER — ONDANSETRON 4 MG/1
4 TABLET, ORALLY DISINTEGRATING ORAL
Status: COMPLETED | OUTPATIENT
Start: 2021-10-18 | End: 2021-10-18

## 2021-10-18 RX ORDER — DICYCLOMINE HYDROCHLORIDE 10 MG/1
10 CAPSULE ORAL
Qty: 120 CAPSULE | Refills: 0 | Status: SHIPPED | OUTPATIENT
Start: 2021-10-18 | End: 2021-11-17

## 2021-10-18 RX ORDER — ONDANSETRON 4 MG/1
4 TABLET, ORALLY DISINTEGRATING ORAL 2 TIMES DAILY
Qty: 15 TABLET | Refills: 0 | Status: SHIPPED | OUTPATIENT
Start: 2021-10-18

## 2021-10-18 RX ADMIN — ONDANSETRON 4 MG: 4 TABLET, ORALLY DISINTEGRATING ORAL at 02:10

## 2022-07-26 ENCOUNTER — OCCUPATIONAL HEALTH (OUTPATIENT)
Dept: URGENT CARE | Facility: CLINIC | Age: 37
End: 2022-07-26

## 2022-07-26 DIAGNOSIS — Z13.9 ENCOUNTER FOR SCREENING: Primary | ICD-10-CM

## 2022-07-26 PROCEDURE — 99199 UNLISTED SPECIAL SVC PX/RPRT: CPT | Mod: S$GLB,,,

## 2022-07-26 PROCEDURE — 80305 DRUG TEST PRSMV DIR OPT OBS: CPT | Mod: S$GLB,,,

## 2022-07-26 PROCEDURE — 99199 OCC MED MRO FEE: ICD-10-PCS | Mod: S$GLB,,,

## 2022-07-26 PROCEDURE — 80305 OOH COLLECTION ONLY DRUG SCREEN: ICD-10-PCS | Mod: S$GLB,,,

## 2022-12-21 ENCOUNTER — OFFICE VISIT (OUTPATIENT)
Dept: URGENT CARE | Facility: CLINIC | Age: 37
End: 2022-12-21
Payer: COMMERCIAL

## 2022-12-21 VITALS
OXYGEN SATURATION: 98 % | WEIGHT: 230 LBS | BODY MASS INDEX: 30.48 KG/M2 | HEIGHT: 73 IN | DIASTOLIC BLOOD PRESSURE: 79 MMHG | TEMPERATURE: 98 F | HEART RATE: 79 BPM | SYSTOLIC BLOOD PRESSURE: 167 MMHG

## 2022-12-21 DIAGNOSIS — Z02.6 ENCOUNTER RELATED TO WORKER'S COMPENSATION CLAIM: ICD-10-CM
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
## 2022-12-21 DIAGNOSIS — K40.90 LEFT INGUINAL HERNIA: Primary | ICD-10-CM

## 2022-12-21 LAB — BREATH ALCOHOL: 0

## 2022-12-21 PROCEDURE — 99203 PR OFFICE/OUTPT VISIT, NEW, LEVL III, 30-44 MIN: ICD-10-PCS | Mod: S$GLB,,, | Performed by: PHYSICIAN ASSISTANT

## 2022-12-21 PROCEDURE — 82075 POCT BREATH ALCOHOL TEST: ICD-10-PCS | Mod: S$GLB,,, | Performed by: PHYSICIAN ASSISTANT

## 2022-12-21 PROCEDURE — 99199 UNLISTED SPECIAL SVC PX/RPRT: CPT | Mod: ,,, | Performed by: PHYSICIAN ASSISTANT

## 2022-12-21 PROCEDURE — 99199 OCC MED MRO FEE: ICD-10-PCS | Mod: ,,, | Performed by: PHYSICIAN ASSISTANT

## 2022-12-21 PROCEDURE — 80305 OOH COLLECTION ONLY DRUG SCREEN: ICD-10-PCS | Mod: QW,S$GLB,, | Performed by: PHYSICIAN ASSISTANT

## 2022-12-21 PROCEDURE — 82075 ASSAY OF BREATH ETHANOL: CPT | Mod: S$GLB,,, | Performed by: PHYSICIAN ASSISTANT

## 2022-12-21 PROCEDURE — 80305 DRUG TEST PRSMV DIR OPT OBS: CPT | Mod: QW,S$GLB,, | Performed by: PHYSICIAN ASSISTANT

## 2022-12-21 PROCEDURE — 99203 OFFICE O/P NEW LOW 30 MIN: CPT | Mod: S$GLB,,, | Performed by: PHYSICIAN ASSISTANT

## 2022-12-21 NOTE — PROGRESS NOTES
Subjective:       Patient ID: Heber Silva is a 37 y.o. male.    Chief Complaint: Groin Injury (Left side)    HPI  ROS     Objective:      Physical Exam    Assessment:       1. Encounter related to worker's compensation claim          Plan:                   No follow-ups on file.

## 2022-12-21 NOTE — PROGRESS NOTES
Subjective:       Patient ID: Heber Silva is a 37 y.o. male.    Chief Complaint: Groin Injury (Left side)    WC, New Visit (DOI 12/21/22) Patient is an  with [employer]. He was lifting weights on his back today while in a squatted position. When he lifted the rack, he felt a twitching in the lower Left side of the groin. He later determined there a protrusion and needed to be seen. The patient reports no history of hernia and no nausea, vomiting or diarrhea. He states the there is some discomfort when he sits or tries to bend over. The patient reports when he pressing on it, it feels like it is filled with fluid. He has not taken any medications at this poin.t The current pain score is 3-4/10. L[facility]      Constitution: Positive for activity change.   HENT: Negative.     Neck: neck negative.   Cardiovascular: Negative.    Eyes: Negative.    Respiratory: Negative.     Gastrointestinal:  Negative for abdominal trauma, nausea, vomiting, diarrhea and bowel incontinence.   Endocrine: negative.   Genitourinary:  Negative for bladder incontinence, penile pain, penile swelling, scrotal swelling and testicular pain.   Musculoskeletal:  Positive for pain.   Skin:  Negative for erythema.   Neurological:  Positive for tingling (groin).      Objective:      Physical Exam  Vitals and nursing note reviewed.   Constitutional:       General: He is not in acute distress.     Appearance: He is well-developed. He is not diaphoretic.   HENT:      Head: Normocephalic and atraumatic.      Right Ear: Hearing and external ear normal.      Left Ear: Hearing and external ear normal.      Nose: Nose normal. No nasal deformity.   Eyes:      General: Lids are normal. No scleral icterus.     Conjunctiva/sclera: Conjunctivae normal.   Neck:      Trachea: Trachea normal.   Cardiovascular:      Pulses: Normal pulses.   Pulmonary:      Effort: Pulmonary effort is normal. No respiratory distress.      Breath sounds: No stridor.   Abdominal:      Hernia:  A hernia is present. Hernia is present in the left inguinal area.   Genitourinary:     Penis: Normal.       Testes: Normal.   Musculoskeletal:      Cervical back: Normal range of motion.   Skin:     General: Skin is warm and dry.      Capillary Refill: Capillary refill takes less than 2 seconds.      Findings: No erythema.   Neurological:      Mental Status: He is alert. He is not disoriented.      GCS: GCS eye subscore is 4. GCS verbal subscore is 5. GCS motor subscore is 6.      Sensory: No sensory deficit.   Psychiatric:         Attention and Perception: He is attentive.         Speech: Speech normal.         Behavior: Behavior normal.       Assessment:       1. Left inguinal hernia    2. Encounter related to worker's compensation claim    3. Work related injury          Plan:       Referred to general surgery. ER precautions discussed. Pt v/u.      Patient Instructions: Referral to specialist to be scheduled, once authorized   Restrictions: No lifting/pushing/pulling more than 25 lbs, Avoid frequent bending/lifting/twisting, Discharged to Ortho/Neuro/Opthomologist/Surgeon  No follow-ups on file.

## 2022-12-21 NOTE — LETTER
[facility]  [address] [address]  Phone: [phone]  Fax: [phone]  [facility] [facility]: [email or web address]    Pt Name: Heber Silva  Injury Date: 12/21/2022   Employee ID: [id number] Date of First Treatment: 12/21/2022   Company: [employer]      Appointment Time: 03:30 PM Arrived: 3:30 PM   Provider: Travis Topete PA-C Time Out: 4:40 PM     Office Treatment:   1. Left inguinal hernia    2. Encounter related to worker's compensation claim    3. Work related injury          Patient Instructions: Referral to specialist to be scheduled, once authorized      Restrictions: No lifting/pushing/pulling more than 25 lbs, Avoid frequent bending/lifting/twisting, Discharged to Ortho/Neuro/Opthomologist/Surgeon     Return As needed. ALEXIA

## 2023-01-06 ENCOUNTER — TELEPHONE (OUTPATIENT)
Dept: SURGERY | Facility: CLINIC | Age: 38
End: 2023-01-06
Payer: COMMERCIAL

## 2023-03-22 ENCOUNTER — OCCUPATIONAL HEALTH (OUTPATIENT)
Dept: URGENT CARE | Facility: CLINIC | Age: 38
End: 2023-03-22

## 2023-03-22 DIAGNOSIS — Z76.89 RETURN TO WORK EVALUATION: Primary | ICD-10-CM

## 2023-03-22 PROCEDURE — 99499 RETURN TO WORK EXAM: BASIC: ICD-10-PCS | Mod: S$GLB,,, | Performed by: PHYSICIAN ASSISTANT

## 2023-03-22 PROCEDURE — 99499 UNLISTED E&M SERVICE: CPT | Mod: S$GLB,,, | Performed by: PHYSICIAN ASSISTANT

## 2023-08-03 ENCOUNTER — OCCUPATIONAL HEALTH (OUTPATIENT)
Dept: URGENT CARE | Facility: CLINIC | Age: 38
End: 2023-08-03

## 2023-08-03 DIAGNOSIS — Z13.9 ENCOUNTER FOR SCREENING: Primary | ICD-10-CM

## 2023-08-03 PROCEDURE — 99199 OCC MED MRO FEE: ICD-10-PCS | Mod: S$GLB,,,

## 2023-08-03 PROCEDURE — 99199 UNLISTED SPECIAL SVC PX/RPRT: CPT | Mod: S$GLB,,,

## 2023-08-03 PROCEDURE — 80305 DRUG TEST PRSMV DIR OPT OBS: CPT | Mod: S$GLB,,,

## 2023-08-03 PROCEDURE — 80305 OOH COLLECTION ONLY DRUG SCREEN: ICD-10-PCS | Mod: S$GLB,,,

## 2024-05-31 ENCOUNTER — OCCUPATIONAL HEALTH (OUTPATIENT)
Dept: URGENT CARE | Facility: CLINIC | Age: 39
End: 2024-05-31

## 2024-05-31 DIAGNOSIS — Z13.9 ENCOUNTER FOR SCREENING: Primary | ICD-10-CM

## 2024-05-31 PROCEDURE — 99199 UNLISTED SPECIAL SVC PX/RPRT: CPT | Mod: S$GLB,,, | Performed by: STUDENT IN AN ORGANIZED HEALTH CARE EDUCATION/TRAINING PROGRAM

## 2024-05-31 PROCEDURE — 80305 DRUG TEST PRSMV DIR OPT OBS: CPT | Mod: S$GLB,,, | Performed by: STUDENT IN AN ORGANIZED HEALTH CARE EDUCATION/TRAINING PROGRAM

## 2024-07-15 ENCOUNTER — OFFICE VISIT (OUTPATIENT)
Dept: URGENT CARE | Facility: CLINIC | Age: 39
End: 2024-07-15
Payer: COMMERCIAL

## 2024-07-15 VITALS
RESPIRATION RATE: 14 BRPM | BODY MASS INDEX: 30.48 KG/M2 | HEART RATE: 78 BPM | DIASTOLIC BLOOD PRESSURE: 90 MMHG | SYSTOLIC BLOOD PRESSURE: 153 MMHG | WEIGHT: 230 LBS | HEIGHT: 73 IN | OXYGEN SATURATION: 97 %

## 2024-07-15 DIAGNOSIS — S46.211A STRAIN OF RIGHT BICEPS, INITIAL ENCOUNTER: ICD-10-CM

## 2024-07-15 DIAGNOSIS — M25.511 RIGHT SHOULDER PAIN, UNSPECIFIED CHRONICITY: ICD-10-CM

## 2024-07-15 DIAGNOSIS — Z02.6 ENCOUNTER RELATED TO WORKER'S COMPENSATION CLAIM: ICD-10-CM

## 2024-07-15 DIAGNOSIS — M67.911 DYSFUNCTION OF RIGHT ROTATOR CUFF: Primary | ICD-10-CM

## 2024-07-15 PROCEDURE — 99203 OFFICE O/P NEW LOW 30 MIN: CPT | Mod: S$GLB,,, | Performed by: SURGERY

## 2024-07-15 RX ORDER — METHYLPREDNISOLONE 4 MG/1
TABLET ORAL
Qty: 21 EACH | Refills: 0 | Status: SHIPPED | OUTPATIENT
Start: 2024-07-15

## 2024-07-15 NOTE — PROGRESS NOTES
Subjective:      Patient ID: Heber Silva is a 39 y.o. male.    Chief Complaint: Shoulder Pain (RT)    Patient's place of employment - [employer]  Patient's job title -   Date of injury - 07/13/2024  Body part injured including left or right - rt shoulder and bicep  Injury Mechanism - lifting   What they were doing when they got hurt - lifting a generator  What they did immediately after - rested then went to ER 4 hrs later  Pain scale right now - 6/10    Shoulder Pain   Associated symptoms include a limited range of motion and numbness.       Musculoskeletal:  Positive for pain, trauma, joint swelling, abnormal ROM of joint, muscle cramps and muscle ache.   Skin:  Negative for wound and laceration.   Neurological:  Positive for numbness and tingling.     See MA note above. Begin MD note:    Heber Silva is a 39 y.o. presenting for evaluation of right shoulder injury. He attempted to shift generator in a truck compartment. As he lifted he felt strain, burning sensation immediately to his anterior shoulder. Now he feels more pain to his bicep. He tried icing after the ED but it caused pain and stiffness. He has been using 2 advil q6h. He denies any history of right shoulder injury. 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     Objective:     Physical Exam  Vitals and nursing note reviewed.   Constitutional:       General: He is not in acute distress.     Appearance: He is not ill-appearing.   HENT:      Head: Normocephalic.   Eyes:      Conjunctiva/sclera: Conjunctivae normal.   Pulmonary:      Effort: Pulmonary effort is normal. No respiratory distress.   Musculoskeletal:         General: Swelling and tenderness present.      Right shoulder: Tenderness and crepitus present. Decreased range of motion. Decreased strength.        Arms:       Comments: There is some swelling of his right shoulder. ROM of shoulder decreased,  degrees, Abd 140 degrees, IR  to L4, decreased extension.   + Speed's, Yokum's. - Nasrin's. Cervical pain with lateral flexion to left. TTP of the trapezius, teres, biceps groove, deltoid, subacromial space.   strength 4+/5 right compared to left. Pain with resisted pronation. TTP of the lateral AC fossa.    Skin:     General: Skin is warm.      Coloration: Skin is not pale.   Neurological:      General: No focal deficit present.      Mental Status: He is alert and oriented to person, place, and time.      GCS: GCS eye subscore is 4. GCS verbal subscore is 5. GCS motor subscore is 6.      Motor: Motor function is intact.      Coordination: Coordination is intact.   Psychiatric:         Attention and Perception: Attention normal.         Mood and Affect: Mood normal.         Speech: Speech normal.         Behavior: Behavior normal. Behavior is cooperative.         Thought Content: Thought content normal.      Assessment:      1. Dysfunction of right rotator cuff    2. Right shoulder pain, unspecified chronicity    3. Encounter related to worker's compensation claim    4. Strain of right biceps, initial encounter      Plan:     I reviewed the ED note related to this injury. Concern for internal derangement of the rotator cuff versus bicep tendon injury, MRI to evaluate. Medrol dosepack to address acute inflammation. Modified Duty. Follow-up in 2 weeks to review MRI, will call if completed sooner.     Medications Ordered This Encounter   Medications    methylPREDNISolone (MEDROL DOSEPACK) 4 mg tablet     Sig: use as directed     Dispense:  21 each     Refill:  0     Diagnoses and plan discussed with the patient, as well as the expected course and duration of symptoms. Risks and benefits of any medication prescribed during this visit was explained, verbal instructions on use given. Clinic/Emergency department return precautions were given, can return [facility] before scheduled follow-up appointment if notes worsening/aggravation of symptoms.  All questions and concerns were addressed prior to discharge. Plan was developed with active input from the patient and they verbalized understanding of and agreement with the POC.  [facility] was informed of any referrals and relevant orders.  Note was dictated with voice recognition software, please excuse any grammatical errors.    I spent a total of 30 minutes on the day of the visit.  This includes face to face time and non-face to face time preparing to see the patient (e.g. review of medical record), obtaining and/or reviewing separately obtained history, documenting clinical information in the electronic or other health record, independently interpreting results and communicating results to the patient/family/caregiver, or care coordinator.    Patient Instructions: MRI to be scheduled once authorized (No NSAIDs while using steroid, okay to use tylenol. Can restart NSAID use once complete steroid)   Restrictions: Limited use of right hand and arm (See [employer] Form)  Follow up in about 2 weeks (around 7/29/2024).

## 2024-07-15 NOTE — LETTER
[facility]  [address] [address]  Phone: [phone]  Fax: [phone]  [facility]: [id number]    Pt Name: Heber Silva  Injury Date: 07/13/2024   Employee ID: [id number] Date of First Treatment: 07/15/2024   Company: [employer]      Appointment Time: Arrived: 9:40 AM    Provider: Moy Gibbons MD Time Out:12:02 PM      Office Treatment:   1. Dysfunction of right rotator cuff    2. Right shoulder pain, unspecified chronicity    3. Encounter related to worker's compensation claim    4. Strain of right biceps, initial encounter      Medications Ordered This Encounter   Medications    methylPREDNISolone (MEDROL DOSEPACK) 4 mg tablet        Patient Instructions: MRI to be scheduled once authorized (No NSAIDs while using steroid, okay to use tylenol. Can restart NSAID use once complete steroid)      Restrictions: Limited use of right hand and arm (See [employer] Form)     Return Appointment: 7/29/2024 at 8:30 AM [facility]

## 2024-07-29 ENCOUNTER — OFFICE VISIT (OUTPATIENT)
Dept: URGENT CARE | Facility: CLINIC | Age: 39
End: 2024-07-29
Payer: COMMERCIAL

## 2024-07-29 VITALS
HEIGHT: 73 IN | RESPIRATION RATE: 16 BRPM | WEIGHT: 230 LBS | HEART RATE: 84 BPM | OXYGEN SATURATION: 98 % | BODY MASS INDEX: 30.48 KG/M2 | SYSTOLIC BLOOD PRESSURE: 124 MMHG | DIASTOLIC BLOOD PRESSURE: 80 MMHG
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
## 2024-07-29 DIAGNOSIS — M67.911 DYSFUNCTION OF RIGHT ROTATOR CUFF: Primary | ICD-10-CM

## 2024-07-29 DIAGNOSIS — S46.211D STRAIN OF RIGHT BICEPS, SUBSEQUENT ENCOUNTER: ICD-10-CM

## 2024-07-29 DIAGNOSIS — Z02.6 ENCOUNTER RELATED TO WORKER'S COMPENSATION CLAIM: ICD-10-CM

## 2024-07-29 PROCEDURE — 99213 OFFICE O/P EST LOW 20 MIN: CPT | Mod: S$GLB,,, | Performed by: SURGERY

## 2024-07-29 NOTE — PROGRESS NOTES
Subjective:      Patient ID: Heber Silva is a 39 y.o. male.    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   Skin:  Negative for wound and laceration.   Neurological:  Positive for numbness and tingling.     See MA note above. Begin MD note:    Heber Silva is a 39 y.o. presenting for follow-up of right shoulder injury. Presented for MRI last week but was told that he needed an open MRI to complete the MRI of humerus due to his shoulders being too broad. He is waiting to hear back about going to [facility] on [address]. He has pain with brushing his teeth, it causes pulsating pain to the anterior shoulder. He also noticed increased pain to the posterior shoulder and is uncertain if it is related to how he is holding his arm or if his posture is off. His trapezius feels knotted like needs to be massaged but not painful. He continues to have sharp pain to the distal bicep to touch and with certain movements.   He did not take the steroid prescribed as he was having GI issues after use of the NSAID. He notes that he took a lot of advil prior to coming to clinic as well as after.     Objective:     Physical Exam  Vitals and nursing note reviewed.   Constitutional:       General: He is not in acute distress.     Appearance: He is not ill-appearing.   HENT:      Head: Normocephalic.   Eyes:      Conjunctiva/sclera: Conjunctivae normal.   Pulmonary:      Effort: Pulmonary effort is normal. No respiratory distress.   Musculoskeletal:         General: Swelling and tenderness present.      Right shoulder: Tenderness and  crepitus present. Decreased range of motion. Decreased strength.        Arms:       Comments: ROM of shoulder decreased,  degrees with pain to anterior shoulder, Abd 140 degrees with pain to anterior and posterior shoulder, IR to L4, decreased extension, pain with reaching in front pocket.   + Speed's, Yokum's. - Nasrin's.  NTTP of the trapezius. TTP of the teres, biceps groove, deltoid, subacromial space.   strength 4+/5 right compared to left. Pain with active supination and pronation. TTP of the lateral AC fossa.    Skin:     General: Skin is warm.      Coloration: Skin is not pale.   Neurological:      General: No focal deficit present.      Mental Status: He is alert and oriented to person, place, and time.      GCS: GCS eye subscore is 4. GCS verbal subscore is 5. GCS motor subscore is 6.      Motor: Motor function is intact.      Coordination: Coordination is intact.   Psychiatric:         Attention and Perception: Attention normal.         Mood and Affect: Mood normal.         Speech: Speech normal.         Behavior: Behavior normal. Behavior is cooperative.         Thought Content: Thought content normal.        Assessment:      1. Dysfunction of right rotator cuff    2. Encounter related to worker's compensation claim    3. Strain of right biceps, subsequent encounter    4. Work related injury      Plan:     Awaiting MRI, anticipate completion this week. I am concerned that MRI will depict a lesion that will require further evaluation by Orthopedics, referral sent. Advised to fill medrol dosepack and start ASAP. Okay to return to clinic the day after completing MRI to review results.        Diagnoses and plan discussed with the patient, as well as the expected course and duration of symptoms. Risks and benefits of any medication prescribed during this visit was explained, verbal instructions on use given. Clinic/Emergency department return precautions were given, can return to [facility] before  scheduled follow-up appointment if notes worsening/aggravation of symptoms. All questions and concerns were addressed prior to discharge. Plan was developed with active input from the patient and they verbalized understanding of and agreement with the POC.  [facility] was informed of any referrals and relevant orders.  Note was dictated with voice recognition software, please excuse any grammatical errors.    I spent a total of 20 minutes on the day of the visit.  This includes face to face time and non-face to face time preparing to see the patient (e.g. review of medical record), obtaining and/or reviewing separately obtained history, documenting clinical information in the electronic or other health record, independently interpreting results and communicating results to the patient/family/caregiver, or care coordinator.    Patient Instructions:  (Have [facility] fax over results to [facility], keep a copy of MRI imaging CD)   Restrictions:  (See [employer] Form)  Follow up for RTC when complete MRI.

## 2024-07-29 NOTE — LETTER
[facility]  [address] [address]  Phone: [phone]  Fax: [phone]  [facility]: [id number]    Pt Name: Heber Silva  Injury Date: 07/13/2024   Employee ID: [id number] Date of Treatment: 07/29/2024   Company: [employer]      Appointment Time: 08:30 AM Arrived: 8:35 AM    Provider: Moy Gibbons MD Time Out:9:30 AM      Office Treatment:   1. Dysfunction of right rotator cuff    2. Encounter related to worker's compensation claim    3. Strain of right biceps, subsequent encounter    4. Work related injury          Patient Instructions:  (Have [facility] fax over results to [facility], keep a copy of MRI imaging CD)      Restrictions:  (See [employer] Form)     Return Appointment: Patient will walk in after MRI.   ALEXIA

## 2024-08-28 ENCOUNTER — TELEPHONE (OUTPATIENT)
Dept: URGENT CARE | Facility: CLINIC | Age: 39
End: 2024-08-28
Payer: COMMERCIAL

## 2024-08-28 DIAGNOSIS — S46.211D STRAIN OF RIGHT BICEPS, SUBSEQUENT ENCOUNTER: ICD-10-CM

## 2024-08-28 DIAGNOSIS — Z02.6 ENCOUNTER RELATED TO WORKER'S COMPENSATION CLAIM: Primary | ICD-10-CM

## 2024-08-28 DIAGNOSIS — M25.511 RIGHT SHOULDER PAIN, UNSPECIFIED CHRONICITY: ICD-10-CM

## 2025-02-28 ENCOUNTER — OFFICE VISIT (OUTPATIENT)
Dept: URGENT CARE | Facility: CLINIC | Age: 40
End: 2025-02-28
Payer: COMMERCIAL

## 2025-02-28 VITALS
OXYGEN SATURATION: 99 % | WEIGHT: 230 LBS | HEIGHT: 73 IN | HEART RATE: 97 BPM | RESPIRATION RATE: 17 BRPM | BODY MASS INDEX: 30.48 KG/M2 | DIASTOLIC BLOOD PRESSURE: 91 MMHG | TEMPERATURE: 99 F | SYSTOLIC BLOOD PRESSURE: 136 MMHG

## 2025-02-28 DIAGNOSIS — Z76.89 ESTABLISHING CARE WITH NEW DOCTOR, ENCOUNTER FOR: ICD-10-CM

## 2025-02-28 DIAGNOSIS — L29.9 PRURITUS: Primary | ICD-10-CM

## 2025-02-28 LAB
ERYTHROCYTE [DISTWIDTH] IN BLOOD BY AUTOMATED COUNT: 12.3 % (ref 11.5–14.5)
HCT VFR BLD AUTO: 50.5 % (ref 40–54)
HGB BLD-MCNC: 16.5 G/DL (ref 14–18)
MCH RBC QN AUTO: 29.9 PG (ref 27–31)
MCHC RBC AUTO-ENTMCNC: 32.7 G/DL (ref 32–36)
MCV RBC AUTO: 92 FL (ref 82–98)
PLATELET # BLD AUTO: 190 K/UL (ref 150–450)
PMV BLD AUTO: 12.2 FL (ref 9.2–12.9)
RBC # BLD AUTO: 5.52 M/UL (ref 4.6–6.2)
WBC # BLD AUTO: 7.48 K/UL (ref 3.9–12.7)

## 2025-02-28 PROCEDURE — 85027 COMPLETE CBC AUTOMATED: CPT | Performed by: PHYSICIAN ASSISTANT

## 2025-02-28 RX ORDER — TRIAMCINOLONE ACETONIDE 1 MG/G
CREAM TOPICAL 2 TIMES DAILY
Qty: 80 G | Refills: 0 | Status: SHIPPED | OUTPATIENT
Start: 2025-02-28

## 2025-02-28 RX ORDER — HYDROXYZINE HYDROCHLORIDE 25 MG/1
25 TABLET, FILM COATED ORAL 3 TIMES DAILY PRN
Qty: 21 TABLET | Refills: 0 | Status: SHIPPED | OUTPATIENT
Start: 2025-02-28

## 2025-02-28 NOTE — PATIENT INSTRUCTIONS
- Rest.    - Drink plenty of fluids.      - Take over-the-counter claritin, zyrtec, allegra, or xyzal as directed for itching.    - you can take the prescribed hydroxyzine as needed as prescribed for itching.  Note that this may cause drowsiness.  Do not drive on this medication.    - the prescribed triamcinolone cream is a high-dose steroid cream for itching and rash.  For external use only.  Do not use to face.     Cool compresses and elevation may help    - make sure your moisturize with lotion regularly as dry skin can cause this itching    We will send CMP just to check due to itching without rash  - Follow up with your PCP or specialty clinic as directed in the next 1-2 weeks if not improved or as needed.  You can call [phone] to schedule an appointment with the appropriate provider.    - Go to the ER or seek medical attention immediately if you develop new or worsening symptoms.     - You must understand that you have received an Urgent Care treatment only and that you may be released before all of your medical problems are known or treated.   - You, the patient, will arrange for follow up care as instructed.   - If your condition worsens or fails to improve we recommend that you receive another evaluation at the ER immediately or contact your PCP to discuss your concerns or return here.

## 2025-02-28 NOTE — PROGRESS NOTES
"Subjective:      Patient ID: Heber Silva is a 39 y.o. male.    Vitals:  height is 6' 1" (1.854 m) and weight is 104.3 kg (230 lb). His oral temperature is 98.7 °F (37.1 °C). His blood pressure is 136/91 (abnormal) and his pulse is 97. His respiration is 17 and oxygen saturation is 99%.     Chief Complaint: itchy legs and feet    39 yr old male came in with complaints of itchy feet that began 3 days ago.  Denies any new known exposures-no new medications, soaps, detergents, etc. patient states that feet have been very itchy consistently.  However he has also noticed some itching in other spots on extremities intermittently.  Currently both feet itch, spot on finger itches, and right lateral thigh itches.  No pain.  He has not seen any rash-there has been no raised bumps swelling lesions redness.  No other new associated symptoms.  Patient only takes daily multivitamin, not on other medications.  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    Other  This is a new problem. The current episode started in the past 7 days. The problem occurs constantly. The problem has been gradually worsening. Pertinent negatives include no abdominal pain, anorexia, arthralgias, change in bowel habit, chest pain, chills, congestion, coughing, diaphoresis, fatigue, fever, headaches, joint swelling, myalgias, nausea, neck pain, numbness, rash, sore throat, swollen glands, urinary symptoms, vertigo, visual change, vomiting or weakness. Nothing aggravates the symptoms. He has tried nothing for the symptoms.       Constitution: Negative for chills, sweating, fatigue and fever.   HENT:  Negative for congestion and sore throat.    Neck: Negative for neck pain.   Cardiovascular:  Negative for chest pain.   Respiratory:  Negative for cough.    Gastrointestinal:  Negative for abdominal pain, nausea and vomiting.   Musculoskeletal:  Negative for joint pain, joint swelling and muscle " ache.   Skin:  Negative for rash.   Allergic/Immunologic: Positive for itching.   Neurological:  Negative for history of vertigo, headaches and numbness.      Objective:     Physical Exam   Constitutional: He is oriented to person, place, and time. He appears well-developed.  Non-toxic appearance. He does not appear ill. No distress.   HENT:   Head: Normocephalic and atraumatic.   Ears:   Right Ear: External ear normal.   Left Ear: External ear normal.   Eyes: Conjunctivae are normal. Right eye exhibits no discharge. Left eye exhibits no discharge. No scleral icterus.   Pulmonary/Chest: Effort normal. No stridor. No respiratory distress. He has no wheezes.   Neurological: He is alert and oriented to person, place, and time.   Skin: Skin is not diaphoretic.        Psychiatric: His behavior is normal. Judgment and thought content normal.   Nursing note and vitals reviewed.      Assessment:     1. Pruritus    2. Establishing care with new doctor, encounter for        Plan:       Pruritus  -     Comprehensive Metabolic Panel  -     triamcinolone acetonide 0.1% (KENALOG) 0.1 % cream; Apply topically 2 (two) times daily.  Dispense: 80 g; Refill: 0  -     hydrOXYzine HCL (ATARAX) 25 MG tablet; Take 1 tablet (25 mg total) by mouth 3 (three) times daily as needed for Itching.  Dispense: 21 tablet; Refill: 0  -     CBC Without Differential    Establishing care with new doctor, encounter for  -     Ambulatory referral/consult to Internal Medicine      Unremarkable exam, new pruritus that is consistent to tops of feet, but also in other areas intermittently on extremities.  No rash present.  Recommended moisturization, treat symptomatically with antihistamine and triamcinolone cream p.r.n..  However etiology at this time is unknown.  As patient does not have PCP, will send CBC and CMP to assess kidneys, liver due to itching without rash.  Referral placed to PCP for follow-up.  Monitor closely, instructed seek medical attention  for new or worsening symptoms.

## 2025-03-02 ENCOUNTER — TELEPHONE (OUTPATIENT)
Dept: URGENT CARE | Facility: CLINIC | Age: 40
End: 2025-03-02
Payer: COMMERCIAL

## 2025-03-02 NOTE — TELEPHONE ENCOUNTER
Informed pt to call the clinic back about labs.     ----- Message from Nahun Kinsey PA-C sent at 3/2/2025  4:34 PM CST -----  Regarding: FW: Lab Client Services  Contact: [phone]  Tameka, please see message below from lab. Looks like the CBC was done, please have someone inform patient it was normal. I assume there was a lab issue with the CMP, please have someone inform the patient about this. Can get it redrawn if pt is still having symptoms or can just follow up with PCP.Thank you,Nahun  ----- Message -----  From: Mary Hinkle  Sent: 3/1/2025   5:25 AM CST  To: Nahun Kinsey PA-C  Subject: Lab Client Services                              Good Morning, My name is Mary Manan, I work in the Lab Client Services. We had a problem with some lab work on this patient. If someone from your office could call us at [phone] or ext. [phone] that would be great. Anyone in my department can help.  Thank you,

## 2025-03-03 ENCOUNTER — RESULTS FOLLOW-UP (OUTPATIENT)
Dept: URGENT CARE | Facility: CLINIC | Age: 40
End: 2025-03-03

## 2025-03-04 NOTE — TELEPHONE ENCOUNTER
----- Message from SKY Dailey-BC sent at 3/3/2025  8:36 AM CST -----  Please inform pt of negative blood test at this time and to follow up with his PCP  ----- Message -----  From: [employer]  Sent: 2/28/2025  10:24 PM CST  To: [facility]

## 2025-07-01 ENCOUNTER — OCCUPATIONAL HEALTH (OUTPATIENT)
Dept: URGENT CARE | Facility: CLINIC | Age: 40
End: 2025-07-01

## 2025-07-01 DIAGNOSIS — Z76.89 RETURN TO WORK EVALUATION: Primary | ICD-10-CM
